# Patient Record
Sex: MALE | Race: WHITE | Employment: OTHER | URBAN - NONMETROPOLITAN AREA
[De-identification: names, ages, dates, MRNs, and addresses within clinical notes are randomized per-mention and may not be internally consistent; named-entity substitution may affect disease eponyms.]

---

## 2020-06-23 ENCOUNTER — HOSPITAL ENCOUNTER (INPATIENT)
Age: 75
LOS: 6 days | Discharge: HOME | DRG: 885 | End: 2020-06-29
Attending: PEDIATRICS | Admitting: PSYCHIATRY & NEUROLOGY
Payer: MEDICARE

## 2020-06-23 LAB
ALBUMIN SERPL-MCNC: 3.9 G/DL (ref 3.5–5.2)
ALP BLD-CCNC: 114 U/L (ref 40–130)
ALT SERPL-CCNC: 18 U/L (ref 5–41)
AMORPHOUS: ABNORMAL /HPF
AMPHETAMINE SCREEN, URINE: NEGATIVE
ANION GAP SERPL CALCULATED.3IONS-SCNC: 10 MMOL/L (ref 7–19)
AST SERPL-CCNC: 22 U/L (ref 5–40)
BACTERIA: ABNORMAL /HPF
BARBITURATE SCREEN URINE: NEGATIVE
BASOPHILS ABSOLUTE: 0 K/UL (ref 0–0.2)
BASOPHILS RELATIVE PERCENT: 0.4 % (ref 0–1)
BENZODIAZEPINE SCREEN, URINE: NEGATIVE
BILIRUB SERPL-MCNC: 0.3 MG/DL (ref 0.2–1.2)
BILIRUBIN URINE: NEGATIVE
BLOOD, URINE: ABNORMAL
BUN BLDV-MCNC: 25 MG/DL (ref 8–23)
CALCIUM SERPL-MCNC: 9.1 MG/DL (ref 8.8–10.2)
CANNABINOID SCREEN URINE: NEGATIVE
CELLULAR CASTS: ABNORMAL /LPF
CHLORIDE BLD-SCNC: 103 MMOL/L (ref 98–111)
CLARITY: ABNORMAL
CO2: 27 MMOL/L (ref 22–29)
COCAINE METABOLITE SCREEN URINE: NEGATIVE
COLOR: YELLOW
CREAT SERPL-MCNC: 1.6 MG/DL (ref 0.5–1.2)
EOSINOPHILS ABSOLUTE: 0.1 K/UL (ref 0–0.6)
EOSINOPHILS RELATIVE PERCENT: 1.1 % (ref 0–5)
EPITHELIAL CELLS, UA: ABNORMAL /HPF
ETHANOL: <10 MG/DL (ref 0–0.08)
GFR NON-AFRICAN AMERICAN: 42
GLUCOSE BLD-MCNC: 102 MG/DL (ref 74–109)
GLUCOSE URINE: NEGATIVE MG/DL
HCT VFR BLD CALC: 42.2 % (ref 42–52)
HEMOGLOBIN: 14.9 G/DL (ref 14–18)
IMMATURE GRANULOCYTES #: 0 K/UL
KETONES, URINE: NEGATIVE MG/DL
LEUKOCYTE ESTERASE, URINE: ABNORMAL
LYMPHOCYTES ABSOLUTE: 1.7 K/UL (ref 1.1–4.5)
LYMPHOCYTES RELATIVE PERCENT: 22.8 % (ref 20–40)
Lab: NORMAL
MCH RBC QN AUTO: 35.7 PG (ref 27–31)
MCHC RBC AUTO-ENTMCNC: 35.3 G/DL (ref 33–37)
MCV RBC AUTO: 101.2 FL (ref 80–94)
MONOCYTES ABSOLUTE: 0.8 K/UL (ref 0–0.9)
MONOCYTES RELATIVE PERCENT: 10.9 % (ref 0–10)
NEUTROPHILS ABSOLUTE: 4.8 K/UL (ref 1.5–7.5)
NEUTROPHILS RELATIVE PERCENT: 64.4 % (ref 50–65)
NITRITE, URINE: NEGATIVE
OPIATE SCREEN URINE: NEGATIVE
PDW BLD-RTO: 12.1 % (ref 11.5–14.5)
PH UA: 7 (ref 5–8)
PLATELET # BLD: 157 K/UL (ref 130–400)
PMV BLD AUTO: 9.1 FL (ref 9.4–12.4)
POTASSIUM REFLEX MAGNESIUM: 3.9 MMOL/L (ref 3.5–5)
PROTEIN UA: ABNORMAL MG/DL
RBC # BLD: 4.17 M/UL (ref 4.7–6.1)
RBC UA: ABNORMAL /HPF (ref 0–2)
SODIUM BLD-SCNC: 140 MMOL/L (ref 136–145)
SPECIFIC GRAVITY UA: 1.02 (ref 1–1.03)
TOTAL PROTEIN: 7.1 G/DL (ref 6.6–8.7)
UROBILINOGEN, URINE: 0.2 E.U./DL
WBC # BLD: 7.4 K/UL (ref 4.8–10.8)
WBC UA: ABNORMAL /HPF (ref 0–5)

## 2020-06-23 PROCEDURE — 87086 URINE CULTURE/COLONY COUNT: CPT

## 2020-06-23 PROCEDURE — G0480 DRUG TEST DEF 1-7 CLASSES: HCPCS

## 2020-06-23 PROCEDURE — 85025 COMPLETE CBC W/AUTO DIFF WBC: CPT

## 2020-06-23 PROCEDURE — 80053 COMPREHEN METABOLIC PANEL: CPT

## 2020-06-23 PROCEDURE — 99285 EMERGENCY DEPT VISIT HI MDM: CPT

## 2020-06-23 PROCEDURE — 36415 COLL VENOUS BLD VENIPUNCTURE: CPT

## 2020-06-23 PROCEDURE — 81001 URINALYSIS AUTO W/SCOPE: CPT

## 2020-06-23 PROCEDURE — 80307 DRUG TEST PRSMV CHEM ANLYZR: CPT

## 2020-06-23 PROCEDURE — 87186 SC STD MICRODIL/AGAR DIL: CPT

## 2020-06-23 PROCEDURE — 1240000000 HC EMOTIONAL WELLNESS R&B

## 2020-06-23 PROCEDURE — 6370000000 HC RX 637 (ALT 250 FOR IP): Performed by: PSYCHIATRY & NEUROLOGY

## 2020-06-23 RX ORDER — CEFDINIR 300 MG/1
300 CAPSULE ORAL EVERY 12 HOURS SCHEDULED
Status: DISCONTINUED | OUTPATIENT
Start: 2020-06-23 | End: 2020-06-24

## 2020-06-23 RX ORDER — TRAZODONE HYDROCHLORIDE 50 MG/1
50 TABLET ORAL NIGHTLY
Status: DISCONTINUED | OUTPATIENT
Start: 2020-06-23 | End: 2020-06-24

## 2020-06-23 RX ORDER — ACETAMINOPHEN 325 MG/1
650 TABLET ORAL EVERY 4 HOURS PRN
Status: DISCONTINUED | OUTPATIENT
Start: 2020-06-23 | End: 2020-06-29 | Stop reason: HOSPADM

## 2020-06-23 RX ORDER — LANOLIN ALCOHOL/MO/W.PET/CERES
3 CREAM (GRAM) TOPICAL NIGHTLY PRN
Status: DISCONTINUED | OUTPATIENT
Start: 2020-06-23 | End: 2020-06-24

## 2020-06-23 RX ORDER — CEFDINIR 300 MG/1
300 CAPSULE ORAL 2 TIMES DAILY
Status: ON HOLD | COMMUNITY
End: 2020-06-29 | Stop reason: HOSPADM

## 2020-06-23 RX ORDER — CLONIDINE HYDROCHLORIDE 0.1 MG/1
0.1 TABLET ORAL EVERY 6 HOURS PRN
Status: DISCONTINUED | OUTPATIENT
Start: 2020-06-23 | End: 2020-06-29 | Stop reason: HOSPADM

## 2020-06-23 RX ORDER — POLYETHYLENE GLYCOL 3350 17 G/17G
17 POWDER, FOR SOLUTION ORAL DAILY PRN
Status: DISCONTINUED | OUTPATIENT
Start: 2020-06-23 | End: 2020-06-29 | Stop reason: HOSPADM

## 2020-06-23 RX ADMIN — MELATONIN 3 MG: at 21:25

## 2020-06-23 RX ADMIN — CEFDINIR 300 MG: 300 CAPSULE ORAL at 21:25

## 2020-06-23 RX ADMIN — TRAZODONE HYDROCHLORIDE 50 MG: 50 TABLET ORAL at 21:25

## 2020-06-23 SDOH — HEALTH STABILITY: MENTAL HEALTH: HOW OFTEN DO YOU HAVE A DRINK CONTAINING ALCOHOL?: NEVER

## 2020-06-23 ASSESSMENT — SLEEP AND FATIGUE QUESTIONNAIRES
DIFFICULTY ARISING: YES
RESTFUL SLEEP: NO
DO YOU HAVE DIFFICULTY SLEEPING: YES
DO YOU USE A SLEEP AID: YES
DIFFICULTY STAYING ASLEEP: YES
DIFFICULTY FALLING ASLEEP: YES
AVERAGE NUMBER OF SLEEP HOURS: 6
SLEEP PATTERN: DIFFICULTY FALLING ASLEEP

## 2020-06-23 ASSESSMENT — PAIN SCALES - GENERAL: PAINLEVEL_OUTOF10: 0

## 2020-06-23 ASSESSMENT — LIFESTYLE VARIABLES: HISTORY_ALCOHOL_USE: NO

## 2020-06-23 ASSESSMENT — PATIENT HEALTH QUESTIONNAIRE - PHQ9: SUM OF ALL RESPONSES TO PHQ QUESTIONS 1-9: 18

## 2020-06-23 NOTE — ED PROVIDER NOTES
abused: None     Forced sexual activity: None   Other Topics Concern    None   Social History Narrative    None       SCREENINGS             PHYSICAL EXAM    (up to 7 for level 4, 8 or more for level 5)     ED Triage Vitals [06/23/20 1604]   BP Temp Temp src Pulse Resp SpO2 Height Weight   106/75 97.6 °F (36.4 °C) -- 96 18 94 % -- 160 lb (72.6 kg)       Physical Exam  Vitals signs reviewed. Constitutional:       Comments: Hard of hearing 66 yr old male   HENT:      Head: Normocephalic. Right Ear: External ear normal.      Left Ear: External ear normal.   Eyes:      Conjunctiva/sclera: Conjunctivae normal.      Pupils: Pupils are equal, round, and reactive to light. Neck:      Musculoskeletal: Normal range of motion. Cardiovascular:      Rate and Rhythm: Normal rate and regular rhythm. Heart sounds: Normal heart sounds. Pulmonary:      Effort: Pulmonary effort is normal.      Breath sounds: Normal breath sounds. Abdominal:      General: Bowel sounds are normal.      Palpations: Abdomen is soft. Comments: Ostomy appliance with health appearing stoma and urine in collection device. Musculoskeletal: Normal range of motion. Skin:     General: Skin is warm and dry. Neurological:      Mental Status: He is alert and oriented to person, place, and time. DIAGNOSTIC RESULTS     EKG: All EKG's are interpreted by the Emergency Department Physician who either signs or Co-signs this chart in the absence of acardiologist.    There is a regular rate and rhythm. SR w/RBBB hr 69b/min Normal OH interval and normal P waves. Normal QT interval. No obvious ST elevation or ST depression.        RADIOLOGY:   Non-plain film images such as CT, Ultrasound andMRI are read by the radiologist. Plain radiographic images are visualized and preliminarily interpreted by the emergency physician with the below findings:        Interpretation per the Radiologist below, if available at the time of this note:    No orders to display         ED BEDSIDE ULTRASOUND:   Performed by ED Physician - none    LABS:  Labs Reviewed   CBC WITH AUTO DIFFERENTIAL - Abnormal; Notable for the following components:       Result Value    RBC 4.17 (*)     .2 (*)     MCH 35.7 (*)     MPV 9.1 (*)     Monocytes % 10.9 (*)     All other components within normal limits   COMPREHENSIVE METABOLIC PANEL W/ REFLEX TO MG FOR LOW K - Abnormal; Notable for the following components:    BUN 25 (*)     CREATININE 1.6 (*)     GFR Non- 42 (*)     All other components within normal limits   URINALYSIS - Abnormal; Notable for the following components:    Clarity, UA TURBID (*)     Blood, Urine TRACE (*)     Protein, UA TRACE (*)     Leukocyte Esterase, Urine LARGE (*)     All other components within normal limits   MICROSCOPIC URINALYSIS - Abnormal; Notable for the following components:    Cellular Cast, UA 1-3 WBC (*)     WBC, UA 31-50 (*)     Bacteria, UA 2+ (*)     Amorphous, UA 1+ (*)     All other components within normal limits   CULTURE, URINE   URINE DRUG SCREEN   ETHANOL   LIPID PANEL   HEMOGLOBIN A1C       All other labs were within normal range or not returned as of this dictation. RE-ASSESSMENT     Pt has been evaluated by mental health profession in conjunction with Dr. Hattie Fischer who accepts patient for voluntary admission to geriatric behavioral health unit. EMERGENCY DEPARTMENT COURSE and DIFFERENTIALDIAGNOSIS/MDM:   Vitals:    Vitals:    06/23/20 1604 06/23/20 1950   BP: 106/75 120/79   Pulse: 96 83   Resp: 18 16   Temp: 97.6 °F (36.4 °C) 97.7 °F (36.5 °C)   TempSrc:  Temporal   SpO2: 94% 93%   Weight: 160 lb (72.6 kg) 172 lb 6.4 oz (78.2 kg)   Height:  5' 5\" (1.651 m)       MDM      CONSULTS:  IP CONSULT TO PSYCHIATRY  IP CONSULT TO INTERNAL MEDICINE  IP CONSULT TO SOCIAL WORK    PROCEDURES:  Unless otherwise notedbelow, none     Procedures    FINAL IMPRESSION     1. Depression with suicidal ideation    2. Urinary tract infection without hematuria, site unspecified    3. Renal insufficiency          DISPOSITION/PLAN   DISPOSITION        PATIENT REFERRED TO:  No follow-up provider specified.     DISCHARGE MEDICATIONS:       Current Discharge Medication List          (Pleasenote that portions of this note were completed with a voice recognition program.  Efforts were made to edit the dictations but occasionally words are mis-transcribed.)              Julian Win, APRN  06/23/20 3202

## 2020-06-23 NOTE — BH NOTE
[facility] ADULT INITIAL INTAKE ASSESSMENT     6/23/20    Channing Antonio ,a 66 y.o. male, presents to the ED for a psychiatric assessment. ED Arrival time: [address]  ED physician: min REEVES Notification time: 56  [facility] Assessment start time: [facility]  Psychiatrist call time:   Spoke with Dr. Leland Rizzo    Patient is referred by: car    Reason for visit to ED - Presenting problem:     PT states reason for ED visit, \" I am majorly depressed. , its been going on for a month. I am at the end of the road. I dont know what to do. I am unable to feel better. The depression is major, everyday it gets worse. I have no appetite I am unable to sleep. I got  a few years ago after 28 years but I have only been gone one month. 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 My ex wife son is a schizophrenic and she has chosen me over him. I am on medicine and I have been taking them correctly but they have quit working. I dont have a therapist or psychiatrist, I get my meds from a doctor. Pt denies ever attempted to hurt self. Pt denies Hi or AVH. Pt denies having any plan but feels very hopeless. Pt has history of bladder cancer 2 years ago and has a urostomy.     Duration of symptoms: month    Current Stressors: family    C-SSRS Completed: yes    SI:  has   Plan: no   Past SI attempts: no   If yes, when and how many times:  Describe suicide attempts:   HI: denies  If yes describe:   Delusions: denies  If yes describe:   Hallucinations: denies   If yes describe:   Risk of Harm to self: Self injurious/self mutilation behaviorsno   If yes explain:   Was it within the past 6 months: no   Risk of Harm to others: no   If yes explain:   Was it within the past 6 months: no   Trauma History:  Anxiety 1-10:  8  Explain if increased:   Depression 1-10:  10  Explain if increased:   Level of function outside hospital decreased: yes   If yes explain:       Psychiatric Hospitalizations: Yes   Where & When: [facility] years ago  Outpatient Psychiatric Treatment:    Family History:    Family history of mental illness: no   \"Depression\",\"Anxiety\",\"Bipolar\",\"Schizophrenia\",\"Borderline\",\"ADHD\"}  Family members with suicide attempt: no   If yes explain (attempted or completed):    Substance Abuse History:     SBIRT Completed: yes  Brief Intervention completed if needed:  (Yes/No)    Current ETOH LEVELS: <10    ETOH Usage:     Amount drinking daily: occasional, social use    Date of last drink: couple days ago   Longest period of sobriety:    Substance/Chemical Abuse/Recreational Drug History:  Substance used: alcohol  Date of last substance use: alcohol two dasy ago   Tobacco Use: no   History of rehab treatment:  How many times in rehab:  Last time in rehab:  Family history of substance abuse:    Opiates: It was discussed with pt they would not be receiving opiates unless they were within 3 days post surgery/acute injury. Patient voiced understanding and agreed. Psychiatric Review Of Systems:     Recent Sleep changes: yes   Recent appetite changes: no   Recent weight changes/Pounds gained (+) or lost (-): no      Medical History:     Medical Diagnosis/Issues:   CT today in ED:no  Use of 02 or CPAP: yes  Ambulatory: no  Independent or Need assistance with Self Care:     PCP: No primary care provider on file. Current Medications:   Scheduled Meds: No current facility-administered medications for this encounter.      Current Outpatient Medications:     Memantine HCl (NAMENDA PO), Take by mouth 2 times daily, Disp: , Rfl:     Cetirizine HCl (ZYRTEC ALLERGY PO), Take by mouth, Disp: , Rfl:     Apixaban (ELIQUIS PO), Take by mouth 2 times daily, Disp: , Rfl:     METOPROLOL TARTRATE PO, Take by mouth 2 times daily, Disp: , Rfl:     Montelukast Sodium (SINGULAIR PO), Take by mouth daily, Disp: , Rfl:     TRAZODONE HCL PO, Take by mouth nightly, Disp: , Rfl:     ipratropium (ATROVENT) 0.02 % nebulizer solution, Take 0.5 mg by nebulization 4 times daily, Disp: , Rfl:     Donepezil HCl (ARICEPT PO), Take by mouth nightly, Disp: , Rfl:      Mental Status Evaluation:     Appearance:  casually dressed   Behavior:  Within Normal Limits   Speech:  normal pitch   Mood:  depressed   Affect:  normal   Thought Process:  within normal limits   Thought Content:  suicidal   Sensorium:  person, place and time/date   Cognition:  grossly intact   Insight:  good       Collateral Information:     Name:   Relationship:   Phone Number:   Collateral:     Current living arrangement: lives with sister   Current Support System: family   Employment: retired     Disposition:     Choose one of the options below for disposition:     1. Decision to admit to :yes    If yes, which unit Adult or Geriatric Unit:  Geriatric  Is patient voluntary: yes  If no has a 72 hold been initiated:   Admission Diagnosis: SI    Does the patient have a guardian or Medical POA:   Has the guardian been notified or Medical POA:       2. Decision to Discharge:   Does not meet criteria for acceptance to   unit due to:     3. Transferred:       Patient was transferred due to:      Other follow up information provided:      Colleen Yip RN

## 2020-06-24 LAB
CHOLESTEROL, TOTAL: 158 MG/DL (ref 160–199)
HBA1C MFR BLD: 5.1 % (ref 4–6)
HDLC SERPL-MCNC: 34 MG/DL (ref 55–121)
LDL CHOLESTEROL CALCULATED: 85 MG/DL
TRIGL SERPL-MCNC: 197 MG/DL (ref 0–149)

## 2020-06-24 PROCEDURE — 94640 AIRWAY INHALATION TREATMENT: CPT

## 2020-06-24 PROCEDURE — 80061 LIPID PANEL: CPT

## 2020-06-24 PROCEDURE — 99223 1ST HOSP IP/OBS HIGH 75: CPT | Performed by: PSYCHIATRY & NEUROLOGY

## 2020-06-24 PROCEDURE — 6360000002 HC RX W HCPCS: Performed by: PSYCHIATRY & NEUROLOGY

## 2020-06-24 PROCEDURE — 6370000000 HC RX 637 (ALT 250 FOR IP): Performed by: FAMILY MEDICINE

## 2020-06-24 PROCEDURE — 1240000000 HC EMOTIONAL WELLNESS R&B

## 2020-06-24 PROCEDURE — 83036 HEMOGLOBIN GLYCOSYLATED A1C: CPT

## 2020-06-24 PROCEDURE — 6370000000 HC RX 637 (ALT 250 FOR IP): Performed by: PSYCHIATRY & NEUROLOGY

## 2020-06-24 PROCEDURE — 36415 COLL VENOUS BLD VENIPUNCTURE: CPT

## 2020-06-24 RX ORDER — MONTELUKAST SODIUM 10 MG/1
10 TABLET ORAL DAILY
Status: DISCONTINUED | OUTPATIENT
Start: 2020-06-24 | End: 2020-06-29 | Stop reason: HOSPADM

## 2020-06-24 RX ORDER — TRAZODONE HYDROCHLORIDE 50 MG/1
25 TABLET ORAL NIGHTLY PRN
Status: DISCONTINUED | OUTPATIENT
Start: 2020-06-24 | End: 2020-06-26

## 2020-06-24 RX ORDER — LEVOFLOXACIN 5 MG/ML
250 INJECTION, SOLUTION INTRAVENOUS EVERY 24 HOURS
Status: DISCONTINUED | OUTPATIENT
Start: 2020-06-25 | End: 2020-06-24

## 2020-06-24 RX ORDER — SERTRALINE HYDROCHLORIDE 100 MG/1
100 TABLET, FILM COATED ORAL DAILY
Status: DISCONTINUED | OUTPATIENT
Start: 2020-06-24 | End: 2020-06-26

## 2020-06-24 RX ORDER — LEVOFLOXACIN 5 MG/ML
500 INJECTION, SOLUTION INTRAVENOUS ONCE
Status: DISCONTINUED | OUTPATIENT
Start: 2020-06-24 | End: 2020-06-24

## 2020-06-24 RX ORDER — MEMANTINE HYDROCHLORIDE 5 MG/1
10 TABLET ORAL 2 TIMES DAILY
Status: DISCONTINUED | OUTPATIENT
Start: 2020-06-24 | End: 2020-06-29 | Stop reason: HOSPADM

## 2020-06-24 RX ORDER — SERTRALINE HYDROCHLORIDE 25 MG/1
25 TABLET, FILM COATED ORAL DAILY
Status: ON HOLD | COMMUNITY
End: 2020-06-29 | Stop reason: HOSPADM

## 2020-06-24 RX ORDER — DONEPEZIL HYDROCHLORIDE 5 MG/1
10 TABLET, FILM COATED ORAL NIGHTLY
Status: DISCONTINUED | OUTPATIENT
Start: 2020-06-24 | End: 2020-06-29 | Stop reason: HOSPADM

## 2020-06-24 RX ORDER — MIRTAZAPINE 7.5 MG/1
7.5 TABLET, FILM COATED ORAL NIGHTLY
Status: DISCONTINUED | OUTPATIENT
Start: 2020-06-24 | End: 2020-06-29 | Stop reason: HOSPADM

## 2020-06-24 RX ORDER — LANOLIN ALCOHOL/MO/W.PET/CERES
3 CREAM (GRAM) TOPICAL NIGHTLY
Status: DISCONTINUED | OUTPATIENT
Start: 2020-06-24 | End: 2020-06-29 | Stop reason: HOSPADM

## 2020-06-24 RX ORDER — LEVOFLOXACIN 500 MG/1
500 TABLET, FILM COATED ORAL ONCE
Status: COMPLETED | OUTPATIENT
Start: 2020-06-24 | End: 2020-06-24

## 2020-06-24 RX ORDER — LEVOFLOXACIN 250 MG/1
250 TABLET ORAL DAILY
Status: DISCONTINUED | OUTPATIENT
Start: 2020-06-25 | End: 2020-06-29 | Stop reason: HOSPADM

## 2020-06-24 RX ADMIN — MELATONIN 3 MG: at 20:56

## 2020-06-24 RX ADMIN — APIXABAN 5 MG: 5 TABLET, FILM COATED ORAL at 18:09

## 2020-06-24 RX ADMIN — SERTRALINE HYDROCHLORIDE 100 MG: 100 TABLET ORAL at 18:09

## 2020-06-24 RX ADMIN — MONTELUKAST SODIUM 10 MG: 10 TABLET, COATED ORAL at 18:09

## 2020-06-24 RX ADMIN — MIRTAZAPINE 7.5 MG: 7.5 TABLET ORAL at 20:56

## 2020-06-24 RX ADMIN — CEFDINIR 300 MG: 300 CAPSULE ORAL at 12:09

## 2020-06-24 RX ADMIN — DONEPEZIL HYDROCHLORIDE 10 MG: 5 TABLET, FILM COATED ORAL at 20:56

## 2020-06-24 RX ADMIN — MEMANTINE HYDROCHLORIDE 10 MG: 5 TABLET ORAL at 20:56

## 2020-06-24 RX ADMIN — IPRATROPIUM BROMIDE 0.5 MG: 0.5 SOLUTION RESPIRATORY (INHALATION) at 20:00

## 2020-06-24 RX ADMIN — LEVOFLOXACIN 500 MG: 500 TABLET, FILM COATED ORAL at 18:09

## 2020-06-24 ASSESSMENT — PAIN SCALES - GENERAL: PAINLEVEL_OUTOF10: 0

## 2020-06-24 NOTE — PLAN OF CARE
Problem: Altered Mood, Depressive Behavior:  Goal: Able to verbalize acceptance of life and situations over which he or she has no control  Description: Able to verbalize acceptance of life and situations over which he or she has no control  Outcome: Ongoing  Goal: Able to verbalize and/or display a decrease in depressive symptoms  Description: Able to verbalize and/or display a decrease in depressive symptoms  Outcome: Ongoing  Goal: Ability to disclose and discuss suicidal ideas will improve  Description: Ability to disclose and discuss suicidal ideas will improve  Outcome: Ongoing  Goal: Able to verbalize support systems  Description: Able to verbalize support systems  Outcome: Ongoing  Goal: Absence of self-harm  Description: Absence of self-harm  Outcome: Ongoing  Goal: Patient specific goal  Description: Patient specific goal  Outcome: Ongoing  Goal: Participates in care planning  Description: Participates in care planning  Outcome: Ongoing     Problem: Falls - Risk of:  Goal: Will remain free from falls  Description: Will remain free from falls  Outcome: Ongoing  Goal: Absence of physical injury  Description: Absence of physical injury  Outcome: Ongoing     Problem: Anxiety:  Goal: Level of anxiety will decrease  Description: Level of anxiety will decrease  Outcome: Ongoing     Problem: Pain:  Goal: Pain level will decrease  Description: Pain level will decrease  Outcome: Ongoing  Goal: Control of acute pain  Description: Control of acute pain  Outcome: Ongoing  Goal: Control of chronic pain  Description: Control of chronic pain  Outcome: Ongoing     Problem: Health Behavior:  Goal: Ability to verbalize adaptive coping strategies to use when suicidal feelings occur will improve  Description: Ability to verbalize adaptive coping strategies to use when suicidal feelings occur will improve  Outcome: Ongoing  Goal: Ability to verbalize adaptive coping strategies to use when the urge to self-mutilate occurs will improve  Description: Ability to verbalize adaptive coping strategies to use when the urge to self-mutilate occurs will improve  Outcome: Ongoing     Problem: Safety:  Goal: Ability to contract for his/her safety will improve  Description: Ability to contract for his/her safety will improve  Outcome: Ongoing

## 2020-06-24 NOTE — PROGRESS NOTES
Group Therapy Note    Start Time: 800  End Time:  900  Number of Participants: 6    Type of Group: Community Meeting       Patient's Goal:        Notes:  Pt did not have any goals      Participation Level:  Active Listener       Participation Quality: Appropriate      Thought Process/Content: Logical      Affective Functioning: Congruent      Mood: Calm      Level of consciousness:  Alert      Modes of Intervention: Support      Discipline Responsible: Behavioral Health Tech II      Signature:  Meredith Miller

## 2020-06-24 NOTE — PROGRESS NOTES
[facility] Admission From ED  Nursing Admission Note    Admission Type: Voluntary    Reason for Admission: pt has depression. pt feels hopeless and feels like its at the end of his road. There is no problem list on file for this patient. Pt admitted from Dr. Elisa Fung care in ED to [address] room 0618/618-01. Arrived on unit via [facility] with security and nursing escort. Pt appropriately attired in paper scrubs. Body assessment completed by Fay HOLLINS RN and Emma Monroe RN with no contraband discovered. All tubes, lines, and drains were appropriately discontinued by ED staff prior to pt transfer to [facility]. Pt belongings and valuables to be inventoried and cataloged, stored per policy. Pt oriented to surroundings, program expectations, and copy pt rights given. Received admit packet: [facility], Visitation Info, Fall Prevention, Restraints Info. Consents reviewed, signed Pt Rights, Handbook Acceptance, Visit/Call Acceptance, PHI Release, Social Info Release, and Treatment Agreement. Pt verbalizes understanding. Pt is a smoker? No.   Pt offered Nicotine patch? No.  Pt refused Nicotine patch? No.     Identifies stressors. Situation with wife and her son. Status and Exam  Normal: No  Facial Expression: Sad  Affect: Appropriate  Level of Consciousness: Alert  Mood:Normal: No  Mood: Depressed, Anxious  Motor Activity:Normal: Yes  Interview Behavior: Cooperative  Attention:Normal: Yes  Thought Content:Normal: Yes  Hallucinations: None  Delusions: No  Memory:Normal: No  Memory: Poor Recent  Insight and Judgment: Yes  Present Suicidal Ideation: No  Present Homicidal Ideation: No    C/o: sinuses    Notes:Pt arrived to the unit per policy. Pt is extremely hard of hearing but declines to have his hearing aides brought up to the unit. Pt states \"they are too expensive. \" Pt also reports that he uses a CPAP machine at home but also declines to have one tonight stating \"I will just see how I do. \" Dr. Meaghan Pretty aware and agreeable.  Pt has a urostomy noted to his right abdominal area; appears patent and draining, and pt performs self care for the urostomy. Pt has asked that his estranged wife, Gogo Ortiz, be notified of his admission. Several attempts to contact her were made, but were unsuccessful. Pt's wife's number, as provided by pt, is [phone]. Pt's medications have not been verified. Pt reports that they are filled at [facility] in [place], [place], But was quite unsure when providing this information. Pt is resting quietly now. Pt ambulates with a steady gait and independent of any aide devices. Monitoring for safety continues.      Electronically signed by Janis Machado RN on 6/23/2020 at 9:51 PM

## 2020-06-24 NOTE — PROGRESS NOTES
SW completed psychosocial and CSSR-S on this date. Pt long and short term goals discussed. Pt voiced understanding. Treatment plan sheet signed. Pt verbalized understanding of the treatment plan. Pt participated in goals and objectives of the treatment plan. Pt completed safety plan with , pt received copy of plan, and original was placed into pt's chart. It was identified that pt will require outpatient follow up appointments at local [facility] behavioral health facility such as[address]. Pt validated need for appointments. Pt was also provided a handout of contact information for drug and alcohol treatment centers and other community support service such as [facility] and [employer]. In the last 6 months has the pt been danger to self: Yes  In the last 6 months has the pt been danger to others: No  Legal Guardian/POA: No      Provided pt with [employer] handout entitled \"Quitting Smoking,\" reviewed handout with pt addressing dangers of smoking, developing coping skills, and providing basic information about quitting. Patient received all components / Patient refused/declined practical counseling of tobacco practical counseling during the hospital stay.      Electronically signed by Ely Chatterjee, [id number] on 6/24/2020 at 11:23 AM

## 2020-06-24 NOTE — PROGRESS NOTES
I Admission From ED  Nursing Admission Note    Admission Type: Voluntary    Reason for Admission: pt has depression. pt feels hopeless and feels like its at the end of his road. There is no problem list on file for this patient.  ,     Identifies stressors.      Status and Exam  Normal: No  Facial Expression: Sad  Affect: Appropriate  Level of Consciousness: Alert  Mood:Normal: No  Mood: Depressed, Anxious  Motor Activity:Normal: Yes  Interview Behavior: Cooperative  Attention:Normal: Yes  Thought Content:Normal: Yes  Hallucinations: None  Delusions: No  Memory:Normal: No  Memory: Poor Recent  Insight and Judgment: Yes  Present Suicidal Ideation: No  Present Homicidal Ideation: No    C/o:    Notes:
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
## 2020-06-24 NOTE — H&P
Department of Psychiatry  Attending History and Physical        CHIEF COMPLAINT: \"I feel depressed\"    History obtained from: patient, chart    HISTORY OF PRESENT ILLNESS:    53-year-old white male with history of depression and dementia, bladder cancer (has urostomy), new to our service, admitted for worsening depression, poor function and hopelessness. Patient stated he got  a few years ago after 29 years of marriage, however, he  from his wife only about 2 months ago. UDS negative, BAL less than 10. Came with OLEG and UTI. Patient is observed resting in bed this morning. He is hard of hearing, and interview was, therefore, somewhat limited. He is calm and cooperative. Smiling at times. Oriented to person, place, date and situation. States he is feeling better today. Had a good rest last night. Denies suicidal ideation at this time. States he has been feeling depressed in the setting of his social stressors. He has been having a hard time adjusting to his life after separation with his wife. He has no children of his own, and has been feeling lonely. \"I feel that I need to find a new purpose in life. \"  He reports low mood, anhedonia, poor sleep and appetite. Voices hopelessness and helplessness. He denies auditory and visual hallucinations. Denies mood swings and racing thoughts. Denies paranoia. He is open to medication adjustments. States his sister is his main support. Gave us permission to contact her. States she can bring his hearing aids and dentures to the unit. Patient currently denies physical symptoms. PSYCHIATRIC HISTORY:    Diagnoses: Depression  Suicide attempts/gestures: Denies   Prior hospitalizations: [facility] over 20 years ago  Medication trials: Denies   Mental health contact: Lost to follow-up   Head trauma: Denies    SUBSTANCE USE HISTORY:  Drinks socially. Denies illicit drug use. Denies tobacco use.     Past Medical History:    Past Medical History:   Diagnosis Date    Dementia ([address]     Depression     [facility]        Past Surgical History:    History reviewed. No pertinent surgical history. Medications Prior to Admission:   Prior to Admission medications    Medication Sig Start Date End Date Taking? Authorizing Provider   Memantine HCl (NAMENDA PO) Take by mouth 2 times daily   Yes Historical Provider, MD   Cetirizine HCl (ZYRTEC ALLERGY PO) Take by mouth   Yes Historical Provider, MD   Apixaban (ELIQUIS PO) Take by mouth 2 times daily   Yes Historical Provider, MD   METOPROLOL TARTRATE PO Take by mouth 2 times daily   Yes Historical Provider, MD   Montelukast Sodium (SINGULAIR PO) Take by mouth daily   Yes Historical Provider, MD   TRAZODONE HCL PO Take by mouth nightly   Yes Historical Provider, MD   ipratropium (ATROVENT) 0.02 % nebulizer solution Take 0.5 mg by nebulization 4 times daily   Yes Historical Provider, MD   Donepezil HCl (ARICEPT PO) Take by mouth nightly   Yes Historical Provider, MD   cefdinir (OMNICEF) 300 MG capsule Take 300 mg by mouth 2 times daily   Yes Historical Provider, MD       Allergies:  Patient has no known allergies. Social History:  . No children of his own. Ex-wife has a son with schizophrenia. Retired. Previously worked as a psychotherapist.    Family History:   History reviewed. No pertinent family history. No history of psychiatric illness or suicide attempts. REVIEW OF SYSTEMS:  General: No fevers, chills, night sweats, no recent weight loss or gain. Head: No headache, no migraine. Eyes: No recent visual changes. Ears: No recent hearing changes. Nose: No increased congestion or change in sense of smell. Throat: No sore throat, no trouble swallowing. Cardiovascular: No chest pain or palpitations, or dizziness. Respiratory: No cough, wheezes, congestion, or shortness of breath.   Gastrointestinal: No abdominal pain, nausea or vomiting, no diarrhea or constipation. Musculo-skeletal: No edema, deformities, or loss of functions. Neurological: No loss of consciousness, abnormal sensations, or weakness. Skin: No rash, abrasions or bruises. PHYSICAL EXAM:  On observation  GENERAL APPEARANCE: Stated age, in NAD. HEAD: Normocephalic, atraumatic. CHEST: No deformities. MUSCULOSKELTAL: No obvious deformities, clubbing, cyanosis or edema. NEUROLOGICAL: Alert, oriented x 3, CN II-XII grossly intact. No abnormal movements or tremors. Gait stable. SKIN: Warm, dry, intact, no rash, abrasions, bruises. Vitals:  /64   Pulse 70   Temp 97.1 °F (36.2 °C) (Temporal)   Resp 18   Ht 5' 5\" (1.651 m)   Wt 172 lb 6.4 oz (78.2 kg)   SpO2 95%   BMI 28.69 kg/m²     Mental Status Examination:    Appearance: Stated age, fairly well groomed, in hospital attire. Behavior: Calm, cooperative, hard of hearing. Notable psychomotor retardation. Speech: Normal in tone, volume, and quality. No slurring, dysarthria or pressured speech noted. Mood: \" Depressed\"   Affect: Mood congruent. Thought Process: Appears linear. Thought Content: Denies suicidal and homicidal ideation. Voices hopelessness and helplessness. No overt delusions or paranoia appreciated. Perceptions: Denies auditory or visual hallucinations at present time. Not responding to internal stimuli. Concentration: Intact. Orientation: to person, place, date, and situation. Language: Intact. Fund of information: Intact. Memory: Recent and remote appear grossly intact. Neurovegitative: Poor appetite and sleep. Insight: Impaired. Judgment: Impaired.     DATA:  Lab Results   Component Value Date    WBC 7.4 06/23/2020    HGB 14.9 06/23/2020    HCT 42.2 06/23/2020    .2 (H) 06/23/2020     06/23/2020     Lab Results   Component Value Date     06/23/2020    K 3.9 06/23/2020     06/23/2020    CO2 27 06/23/2020    BUN 25 (H) 06/23/2020 understanding and has capacity to give informed consent.  -SW help evaluate home environment and provide outpatient resources.  -Discuss with treatment team.

## 2020-06-24 NOTE — PROGRESS NOTES
Call placed to Dr. Irineo Alvarez in regard to medication orders. Home meds reviewed(not verified with pharmacy.) Vital signs reviewed. Orders received to give Trazadone 50mg at night PO, Melatonin 3mg PO PRN for sleep, Clonidine 0.1mg PO Q6 PRN High BP, systolic >139 and diastolic >398. Dr. Amara Abdalla made aware of pt's wish to wait on CPAP after tonight's rest to see how he does without it. Dr. Amara Abdalla in agreement. Dr. Amara Abdalla declines to order I&O monitoring at this time. Orders to be executed as received.      Electronically signed by Misael Carter RN on 6/23/2020 at 8:54 PM

## 2020-06-24 NOTE — PROGRESS NOTES
Pharmacy Renal Adjustment    Aime Campbell is a 66 y.o. male. Pharmacy has renally adjusted medications per protocol. Recent Labs     06/23/20  1640   BUN 25*       Recent Labs     06/23/20  1640   CREATININE 1.6*       Estimated Creatinine Clearance: 38 mL/min (A) (based on SCr of 1.6 mg/dL (H)). Height:   Ht Readings from Last 1 Encounters:   06/23/20 5' 5\" (1.651 m)     Weight:  Wt Readings from Last 1 Encounters:   06/23/20 172 lb 6.4 oz (78.2 kg)       Plan: Adjust the following medications based on renal function:  Change Levaquin to 500mg IV x 1 dose then Levaquin 250mg IV q 24 hours.     JAMES SAN, PHARM D, 6/24/2020, 5:11 PM

## 2020-06-24 NOTE — PROGRESS NOTES
SW met with treatment team to discuss pt's progress and setbacks. SW 2 was present. Pt was admitted, voluntary, for depression, SI without a plan, reports poor functioning at home, has hx of psychiatric treatment/admission, identifies current stressors as family issues, denies HI/AVH. Since admission, pt reportedly was cooperative with admission process, alert/oriented x 4, is [place], but refuses to have his hearing aides brought to the unit, slept well last night with medications, denies SI/HI/AVH, continue current treatment plan.

## 2020-06-24 NOTE — PROGRESS NOTES
Pt awake this morning for lab work. Pt reporting that \" I just can't believe how much better I feel after one night. I slept so good. \" Pt reports to nursing that it didn't bother him very much that he didn't have his CPAP overnight. Pt appears to be overjoyed with his progress so far and states so several times to this nurse and the . Pt continues to rest this morning. Monitoring for safety continues.      Electronically signed by Haylie Messina RN on 6/24/2020 at 5:40 AM

## 2020-06-24 NOTE — PROGRESS NOTES
Admission Note      Reason for admission/Target Symptom: Patient admitted to [facility] due to depression and suicidal thoughts, pt reported he is , was in a facility in [place], worsening depression over past month, reported he was at the end of his rope, denies HI/AVH. Diagnoses: Depression with suicidal ideation, Urinary tract infection without hematuria, site unspecified, Renal insufficiency   UDS: Negative  BAL:  Negative ,10    SW met with treatment team to discuss patient's treatment including care planning, discharge planning, and follow-up needs. Pt has been admitted to [facility]. Treatment team has identified patient's discharge needs as medication management and outpatient therapy/counseling. Pt confirmed  the need for ongoing treatment post inpatient stay. Pt was also provided a handout of contact information for drug and alcohol treatment centers and other community support service such as BILL, AA, and [facility].     Electronically signed by Pratik Camacho [facility] on 6/24/2020 at 9:06 AM

## 2020-06-25 LAB
ANION GAP SERPL CALCULATED.3IONS-SCNC: 13 MMOL/L (ref 7–19)
BUN BLDV-MCNC: 22 MG/DL (ref 8–23)
CALCIUM SERPL-MCNC: 8.8 MG/DL (ref 8.8–10.2)
CHLORIDE BLD-SCNC: 106 MMOL/L (ref 98–111)
CO2: 25 MMOL/L (ref 22–29)
CREAT SERPL-MCNC: 1.4 MG/DL (ref 0.5–1.2)
GFR NON-AFRICAN AMERICAN: 49
GLUCOSE BLD-MCNC: 94 MG/DL (ref 74–109)
ORGANISM: ABNORMAL
POTASSIUM SERPL-SCNC: 4 MMOL/L (ref 3.5–5)
SODIUM BLD-SCNC: 144 MMOL/L (ref 136–145)
TSH REFLEX FT4: 1.78 UIU/ML (ref 0.35–5.5)
URINE CULTURE, ROUTINE: ABNORMAL
URINE CULTURE, ROUTINE: ABNORMAL
VITAMIN B-12: 537 PG/ML (ref 211–946)
VITAMIN D 25-HYDROXY: 48.3 NG/ML

## 2020-06-25 PROCEDURE — 84443 ASSAY THYROID STIM HORMONE: CPT

## 2020-06-25 PROCEDURE — 6360000002 HC RX W HCPCS: Performed by: PSYCHIATRY & NEUROLOGY

## 2020-06-25 PROCEDURE — 6370000000 HC RX 637 (ALT 250 FOR IP): Performed by: PSYCHIATRY & NEUROLOGY

## 2020-06-25 PROCEDURE — 82607 VITAMIN B-12: CPT

## 2020-06-25 PROCEDURE — 94640 AIRWAY INHALATION TREATMENT: CPT

## 2020-06-25 PROCEDURE — 99233 SBSQ HOSP IP/OBS HIGH 50: CPT | Performed by: PSYCHIATRY & NEUROLOGY

## 2020-06-25 PROCEDURE — 1240000000 HC EMOTIONAL WELLNESS R&B

## 2020-06-25 PROCEDURE — 36415 COLL VENOUS BLD VENIPUNCTURE: CPT

## 2020-06-25 PROCEDURE — 6370000000 HC RX 637 (ALT 250 FOR IP): Performed by: FAMILY MEDICINE

## 2020-06-25 PROCEDURE — 82306 VITAMIN D 25 HYDROXY: CPT

## 2020-06-25 PROCEDURE — 80048 BASIC METABOLIC PNL TOTAL CA: CPT

## 2020-06-25 RX ADMIN — MEMANTINE HYDROCHLORIDE 10 MG: 5 TABLET ORAL at 21:03

## 2020-06-25 RX ADMIN — APIXABAN 5 MG: 5 TABLET, FILM COATED ORAL at 08:37

## 2020-06-25 RX ADMIN — SERTRALINE HYDROCHLORIDE 100 MG: 100 TABLET ORAL at 08:37

## 2020-06-25 RX ADMIN — IPRATROPIUM BROMIDE 0.5 MG: 0.5 SOLUTION RESPIRATORY (INHALATION) at 14:51

## 2020-06-25 RX ADMIN — LEVOFLOXACIN 250 MG: 250 TABLET, FILM COATED ORAL at 17:58

## 2020-06-25 RX ADMIN — MEMANTINE HYDROCHLORIDE 10 MG: 5 TABLET ORAL at 08:37

## 2020-06-25 RX ADMIN — IPRATROPIUM BROMIDE 0.5 MG: 0.5 SOLUTION RESPIRATORY (INHALATION) at 11:30

## 2020-06-25 RX ADMIN — IPRATROPIUM BROMIDE 0.5 MG: 0.5 SOLUTION RESPIRATORY (INHALATION) at 19:14

## 2020-06-25 RX ADMIN — APIXABAN 5 MG: 5 TABLET, FILM COATED ORAL at 21:03

## 2020-06-25 RX ADMIN — DONEPEZIL HYDROCHLORIDE 10 MG: 5 TABLET, FILM COATED ORAL at 21:03

## 2020-06-25 RX ADMIN — MIRTAZAPINE 7.5 MG: 7.5 TABLET ORAL at 21:04

## 2020-06-25 RX ADMIN — IPRATROPIUM BROMIDE 0.5 MG: 0.5 SOLUTION RESPIRATORY (INHALATION) at 07:27

## 2020-06-25 RX ADMIN — MELATONIN 3 MG: at 21:03

## 2020-06-25 RX ADMIN — MONTELUKAST SODIUM 10 MG: 10 TABLET, COATED ORAL at 08:37

## 2020-06-25 ASSESSMENT — PAIN SCALES - GENERAL: PAINLEVEL_OUTOF10: 0

## 2020-06-25 NOTE — PROGRESS NOTES
Group Therapy Note    Date: 6/25/2020  Start Time: 1700  End Time:  7077  Number of Participants: 3    Type of Group: Healthy Living/Wellness    Status After Intervention:  Unchanged    Participation Level: None    Participation Quality: Resistant      Speech:  normal            Endings: None Reported    Modes of Intervention: Support      Discipline Responsible: Registered Nurse      Signature:  Ruthy Godoy RN

## 2020-06-25 NOTE — PLAN OF CARE
Problem: Altered Mood, Depressive Behavior:  Goal: Able to verbalize acceptance of life and situations over which he or she has no control  Description: Able to verbalize acceptance of life and situations over which he or she has no control  6/25/2020 1343 by Fawad Humphrey  Outcome: Ongoing  Note:                                                                     Group Therapy Note    Date: 6/25/2020  Start Time: 1100  End Time:  9246  Number of Participants: 5    Type of Group: Psychoeducation    Wellness Binder Information  Module Name:  Men's Issues  Session Number:  1    Group Goal for Pt: To improve knowledge of effective stress management techniques    Notes:  Pt demonstrated improved knowledge of effective stress management techniques by actively participating in group discussion.     Status After Intervention:  Unchanged    Participation Level: Minimal    Participation Quality: Resistant      Speech:  normal      Thought Process/Content: Logical      Affective Functioning: Congruent      Mood: anxious and depressed      Level of consciousness:  Alert and Oriented x4      Response to Learning: Able to verbalize current knowledge/experience, Able to verbalize/acknowledge new learning, and Resistant      Endings: None Reported    Modes of Intervention: Education      Discipline Responsible: Psychoeducational Specialist      Signature:  Fawad Humphrey

## 2020-06-25 NOTE — PROGRESS NOTES
Progress Note  Abad Vigil  6/25/2020 8:03 AM  Subjective:   Admit Date:   6/23/2020      CC/ADMIT DX:       Interval History:   Reviewed overnight events and nursing notes. No new physical complaints. I have reviewed all labs/diagnostics from the last 24hrs. ROS:   I have done a 10 point ROS and all are negative, except what is mentioned in the HPI. DIET LOW FAT; Medications:      mirtazapine  7.5 mg Oral Nightly    melatonin  3 mg Oral Nightly    apixaban  5 mg Oral BID    donepezil  10 mg Oral Nightly    ipratropium  0.5 mg Nebulization 4x Daily    memantine  10 mg Oral BID    montelukast  10 mg Oral Daily    sertraline  100 mg Oral Daily    levoFLOXacin  250 mg Oral Daily           Objective:   Vitals: /76   Pulse 78   Temp 99.5 °F (37.5 °C) (Temporal)   Resp 18   Ht 5' 5\" (1.651 m)   Wt 172 lb 6.4 oz (78.2 kg)   SpO2 92%   BMI 28.69 kg/m²  No intake or output data in the 24 hours ending 06/25/20 0803  General appearance: alert and cooperative with exam  Extremities: extremities normal, atraumatic, no cyanosis or edema  Neurologic:  No obvious focal neurologic deficits. Skin: no rashes    Assessment and Plan:   Depression  UTI    Plan:  1. Continue present medication(s)   2. Follow with Psych  3. He is medically stable. I will monitor for any changes or concerns. Discharge planning:  home     Reviewed treatment plans with the patient and/or family.              Electronically signed by Maya Smith MD on 6/25/2020 at 8:03 AM

## 2020-06-25 NOTE — PROGRESS NOTES
Pt up this morning with prompting. Pt is calm and cooperative, pleasant during interaction. Pt reports having slept well last night. Pt reports that his concentration is improving. Pt reports that anxiety and depression are both improving. Pt denies any hallucinations, suicidal thoughts or anxiety attacks. No distress noted. Will continue to monitor.

## 2020-06-25 NOTE — PROGRESS NOTES
SW met with treatment team to discuss pt's progress and setbacks. SW 2 was present. Pt reportedly slept well last night, with medications, appetite is good, attends scheduled group activities, social with peers/staff, performs ADL's, compliant with medications, behavior has been cooperative, denies depression/anxiety, denies SI/HI/AVH, continue current treatment plan.

## 2020-06-25 NOTE — PROGRESS NOTES
Department of Psychiatry  Attending Progress Note - Geriatric      SUBJECTIVE:    66years old male with previous psychiatric history of depression, complicated by history of bladder cancer, currently has urostomy, who has been admitted to psychiatric unit secondary worsening of the depression. Patient has been seen in treatment team room with presence of . Patient is alert and oriented on current situation, day, date, month and a year. Patient reported that his condition mildly improved since admission and his mood is \"a little better\" today. He endorses good appetite and improved quality of sleep, stated that he did not experience any difficulties to fall asleep or stay asleep during the last night. Patient is compliant with currently prescribed medications and denies any side effect. He continues to report mild feeling of depression and anxiety, and rated both of them as 1-2 out of 10, with 10 being the worst.  He continues to report feeling of hopelessness after ending up long-term relationships. However, patient stated that he feels positive and will try to reassess his life priorities. Patient denies any issues with his urostomy, stated that it is functioning well. Patient attends group activities in the unit and participates in them. He became more social with medical staff and other patients in the unit. He performed his ADLs today. Currently patient denies any active suicidal or homicidal ideations, denies any plans. Also, he denies any auditory and visual hallucinations. OBJECTIVE    Physical  VITALS:  /89   Pulse 110   Temp 97.9 °F (36.6 °C) (Temporal)   Resp 20   Ht 5' 5\" (1.651 m)   Wt 172 lb 6.4 oz (78.2 kg)   SpO2 92%   BMI 28.69 kg/m²   TEMPERATURE:  Current - Temp: 97.9 °F (36.6 °C);  Max - Temp  Av.7 °F (37.1 °C)  Min: 97.9 °F (36.6 °C)  Max: 99.5 °F (37.5 °C)  RESPIRATIONS RANGE: Resp  Av  Min: 18  Max: 20  PULSE RANGE: Pulse  Av  Min: 66  Max: 110  BLOOD PRESSURE RANGE:  Systolic (21GTM), NFE:930 , Min:103 , WXR:039   ; Diastolic (03IBU), IIS:89, Min:76, Max:89    PULSE OXIMETRY RANGE: SpO2  Av %  Min: 92 %  Max: 92 %    Review of Systems: 14 point review of systems is negative    Psychological ROS: Positive for depression and anxiety    Mental Status Examination:   Appearance: Appropriately groomed and in hospital attire. Made good eye contact. Behavior: Calm, cooperative, friendly, and socially appropriate. Mild psychomotor retardation appreciated. Gait unremarkable. Speech: Normal in tone, volume, and quality. Mood: \"A little better\"   Affect: Mood congruent. Range is flat and restricted. Thought Process: Appears linear and goal oriented. Thought Content: Patient does not have any current active suicidal and   homicidal ideations. No overt delusions or paranoia appreciated. Perceptions: Seems patient does not have any auditory or visual hallucinations at present time. Patient did not appear to be responding to internal stimuli. No overt psychosis. Orientation: to person, place, date, and situation. Alert. Language: Intact. Fund of information: Intact. Memory: recent and remote appear intact. Impulsivity: Improved  Neurovegitative: Fair appetite, improved sleep.    Insight: Present  Judgment: Limited      Data  Lab Results   Component Value Date    TSHFT4 1.78 2020     Lab Results   Component Value Date    [id number] 005 2020     Lab Results   Component Value Date    VITD25 48.3 2020       Medications  Current Facility-Administered Medications: mirtazapine (REMERON) tablet 7.5 mg, 7.5 mg, Oral, Nightly  melatonin tablet 3 mg, 3 mg, Oral, Nightly  traZODone (DESYREL) tablet 25 mg, 25 mg, Oral, Nightly PRN  apixaban (ELIQUIS) tablet 5 mg, 5 mg, Oral, BID  donepezil (ARICEPT) tablet 10 mg, 10 mg, Oral, Nightly  ipratropium (ATROVENT) 0.02 % nebulizer solution 0.5 mg, 0.5 mg, Nebulization, 4x

## 2020-06-25 NOTE — PLAN OF CARE
Group Therapy Note    Date: 6/25/2020  Start Time: 1500  End Time:  1600  Number of Participants: 3    Type of Group: Recovery    Wellness Binder Information  Module Name:  Women's Issues  Session Number:  1    Group Goal for Pt: To raise awareness of the effectiveness of assertive communication    Notes:  Pt did not attend group discussion. Pt was invited/encouraged. Status After Intervention:      Participation Level:     Participation Quality:       Speech:         Thought Process/Content:       Affective Functioning:     Mood:       Level of consciousness:        Response to Learning:       Endings:     Modes of Intervention:       Discipline Responsible:       Signature:  Emerita Zamudio

## 2020-06-26 PROBLEM — F33.2 MAJOR DEPRESSIVE DISORDER, RECURRENT SEVERE WITHOUT PSYCHOTIC FEATURES (HCC): Status: ACTIVE | Noted: 2020-06-26

## 2020-06-26 PROCEDURE — 6370000000 HC RX 637 (ALT 250 FOR IP): Performed by: PSYCHIATRY & NEUROLOGY

## 2020-06-26 PROCEDURE — 6360000002 HC RX W HCPCS: Performed by: PSYCHIATRY & NEUROLOGY

## 2020-06-26 PROCEDURE — 1240000000 HC EMOTIONAL WELLNESS R&B

## 2020-06-26 PROCEDURE — 94640 AIRWAY INHALATION TREATMENT: CPT

## 2020-06-26 PROCEDURE — 99232 SBSQ HOSP IP/OBS MODERATE 35: CPT | Performed by: PSYCHIATRY & NEUROLOGY

## 2020-06-26 PROCEDURE — 6370000000 HC RX 637 (ALT 250 FOR IP): Performed by: FAMILY MEDICINE

## 2020-06-26 RX ORDER — TRAZODONE HYDROCHLORIDE 50 MG/1
50 TABLET ORAL NIGHTLY
Status: DISCONTINUED | OUTPATIENT
Start: 2020-06-26 | End: 2020-06-28

## 2020-06-26 RX ADMIN — MELATONIN 3 MG: at 20:57

## 2020-06-26 RX ADMIN — APIXABAN 5 MG: 5 TABLET, FILM COATED ORAL at 20:57

## 2020-06-26 RX ADMIN — LEVOFLOXACIN 250 MG: 250 TABLET, FILM COATED ORAL at 17:45

## 2020-06-26 RX ADMIN — IPRATROPIUM BROMIDE 0.5 MG: 0.5 SOLUTION RESPIRATORY (INHALATION) at 16:12

## 2020-06-26 RX ADMIN — MEMANTINE HYDROCHLORIDE 10 MG: 5 TABLET ORAL at 20:57

## 2020-06-26 RX ADMIN — MEMANTINE HYDROCHLORIDE 10 MG: 5 TABLET ORAL at 08:46

## 2020-06-26 RX ADMIN — IPRATROPIUM BROMIDE 0.5 MG: 0.5 SOLUTION RESPIRATORY (INHALATION) at 07:22

## 2020-06-26 RX ADMIN — IPRATROPIUM BROMIDE 0.5 MG: 0.5 SOLUTION RESPIRATORY (INHALATION) at 10:41

## 2020-06-26 RX ADMIN — MIRTAZAPINE 7.5 MG: 7.5 TABLET ORAL at 20:56

## 2020-06-26 RX ADMIN — SERTRALINE HYDROCHLORIDE 100 MG: 100 TABLET ORAL at 08:46

## 2020-06-26 RX ADMIN — MONTELUKAST SODIUM 10 MG: 10 TABLET, COATED ORAL at 08:46

## 2020-06-26 RX ADMIN — DONEPEZIL HYDROCHLORIDE 10 MG: 5 TABLET, FILM COATED ORAL at 20:57

## 2020-06-26 RX ADMIN — APIXABAN 5 MG: 5 TABLET, FILM COATED ORAL at 08:46

## 2020-06-26 RX ADMIN — IPRATROPIUM BROMIDE 0.5 MG: 0.5 SOLUTION RESPIRATORY (INHALATION) at 18:20

## 2020-06-26 RX ADMIN — TRAZODONE HYDROCHLORIDE 50 MG: 50 TABLET ORAL at 20:57

## 2020-06-26 ASSESSMENT — PAIN SCALES - GENERAL
PAINLEVEL_OUTOF10: 0
PAINLEVEL_OUTOF10: 0

## 2020-06-26 NOTE — PROGRESS NOTES
Department of Psychiatry  Attending Progress Note     Chief complaint: \"I didn't sleep much\"    SUBJECTIVE:   Chart reviewed, discussed with the team.  No major issues overnight. Patient is compliant with treatment and performs ADLs. Participation in group activities is limited due to communication barriers. Patient still does not have his hearing aids. Patient presents with somewhat brighter affect this morning. He is oriented to person, place, date and situation. States depression has somewhat improved, however, he is still sleeping poorly. Appetite remains poor as well. Patient denies suicidal ideation. Hoping to get his hearing aids today. Feels that he is not ready to go home yet. OBJECTIVE    Physical  Wt Readings from Last 3 Encounters:   06/23/20 172 lb 6.4 oz (78.2 kg)     Temp Readings from Last 3 Encounters:   06/26/20 97.8 °F (36.6 °C) (Temporal)     BP Readings from Last 3 Encounters:   06/26/20 (!) 131/93     Pulse Readings from Last 3 Encounters:   06/26/20 116        Review of Systems: 14-point review of systems negative except as described above    Mental Status Examination:   Appearance: Stated age, in hospital attire. Behavior: Calm, cooperative, smiling at times. Speech: Normal in tone, volume, and quality. No slurring, dysarthria or pressured speech noted. Mood: \" Better\"   Affect: Brighter  Thought Process: Appears linear. Thought Content: Denies suicidal and homicidal ideation. No overt delusions or paranoia appreciated. Perceptions: Denies auditory or visual hallucinations at present time. Not responding to internal stimuli. Concentration: Intact. Orientation: to person, place, date, and situation. Language: Intact. Fund of information: Intact. Memory: Recent and remote appear intact. Neurovegitative: Poor appetite and sleep. Insight: Limited. Judgment: Limited.     Data  Lab Results   Component Value Date    WBC 7.4 06/23/2020    HGB 14.9 06/23/2020

## 2020-06-26 NOTE — PROGRESS NOTES
[facility] Daily Shift Assessment-Geriatric Unit  Nursing Progress Note          Room: [facility]/618-01   Name: Albert Boss   Age: 66 y.o. Gender: male   Dx: <principal problem not specified>  Precautions: suicide risk and fall risk  Inpatient Status: voluntary     SLEEP:    Sleep: Yes,   Sleep Quality Fair   Hours Slept:    Sleep Medications: Yes  PRN Sleep Meds: No       MEDICAL:      Other PRN Meds: No   Med Compliant: Yes   Accu-Chek: No   Oxygen/CPAP/BiPAP: No  CIWA/CINA: No   PAIN Assessment: none  Side Effects from medication: No      PSYCH:     SI denies suicidal ideation    HI Negative for homicidal ideation        AVH:Absent      Depression: 7-8 Anxiety: low       GENERAL:      Appetite: no change from normal  Social: No Speech: normal   Appearance:appropriately dressed  Assistive Devices: noneLevel of Assist: Independent      GROUP:    Group Participation: Yes  Participation LevelActive Listener    Participation QualityAppropriate    Notes:   Patient has been in his room this whole shift. He has been alert and oriented. Able to voice his needs. He reports that his depression is going to take a while to get better. He reports that his anxiety is low. Patient has a urostomy on his right side. Patient is independent with care to his urostomy. He empties his urostomy himself. He ambulates without difficulty. Patient is hard of hearing but able to answer assessment questions appropriately.          Electronically signed by Thang Toussaint RN on 6/26/20 at 1:20 AM CDT

## 2020-06-26 NOTE — PROGRESS NOTES
SW met with treatment team to discuss pt's progress and setbacks. SW 2 was present. Pt reportedly slept fair last night, with medications, appetite is good, attends scheduled group activities, is [place] and has some difficulty hearing, isolative to self, performs ADL's, compliant with medications, behavior has been cooperative, reports severe depression, mild anxiety, denies SI/HI/AVH, continue current treatment plan.

## 2020-06-26 NOTE — PROGRESS NOTES
WRAP UP GROUP NOTE:     Patient's Goal:  Providing feedback as to their own progress in the care-plan provided. Pt's have an opportunity to explore self-reflective skills and share any additional cares and concerns not yet addressed. Pt effectively participated.      Energy level:LOW  Appetite:GOOD  Concentration: GOOD  Hallucinations:BLANK  Depression:BLANK  Anxiety:BLANK  How I worked today:BLANK  What helps me sleep:BLANK  Any questions/complaints/comments:BLANK DISPLAY PLAN FREE TEXT

## 2020-06-27 LAB
ANION GAP SERPL CALCULATED.3IONS-SCNC: 9 MMOL/L (ref 7–19)
BUN BLDV-MCNC: 22 MG/DL (ref 8–23)
CALCIUM SERPL-MCNC: 8.5 MG/DL (ref 8.8–10.2)
CHLORIDE BLD-SCNC: 106 MMOL/L (ref 98–111)
CO2: 27 MMOL/L (ref 22–29)
CREAT SERPL-MCNC: 1.4 MG/DL (ref 0.5–1.2)
GFR NON-AFRICAN AMERICAN: 49
GLUCOSE BLD-MCNC: 99 MG/DL (ref 74–109)
POTASSIUM SERPL-SCNC: 4.6 MMOL/L (ref 3.5–5)
SODIUM BLD-SCNC: 142 MMOL/L (ref 136–145)

## 2020-06-27 PROCEDURE — 6370000000 HC RX 637 (ALT 250 FOR IP): Performed by: PSYCHIATRY & NEUROLOGY

## 2020-06-27 PROCEDURE — 94640 AIRWAY INHALATION TREATMENT: CPT

## 2020-06-27 PROCEDURE — 36415 COLL VENOUS BLD VENIPUNCTURE: CPT

## 2020-06-27 PROCEDURE — 80048 BASIC METABOLIC PNL TOTAL CA: CPT

## 2020-06-27 PROCEDURE — 6370000000 HC RX 637 (ALT 250 FOR IP): Performed by: FAMILY MEDICINE

## 2020-06-27 PROCEDURE — 6360000002 HC RX W HCPCS: Performed by: PSYCHIATRY & NEUROLOGY

## 2020-06-27 PROCEDURE — 1240000000 HC EMOTIONAL WELLNESS R&B

## 2020-06-27 RX ORDER — FLUTICASONE PROPIONATE 50 MCG
2 SPRAY, SUSPENSION (ML) NASAL DAILY
Status: DISCONTINUED | OUTPATIENT
Start: 2020-06-27 | End: 2020-06-29 | Stop reason: HOSPADM

## 2020-06-27 RX ORDER — GUAIFENESIN 600 MG/1
1200 TABLET, EXTENDED RELEASE ORAL 2 TIMES DAILY
Status: DISCONTINUED | OUTPATIENT
Start: 2020-06-27 | End: 2020-06-29 | Stop reason: HOSPADM

## 2020-06-27 RX ADMIN — FLUTICASONE PROPIONATE 2 SPRAY: 50 SPRAY, METERED NASAL at 16:12

## 2020-06-27 RX ADMIN — MEMANTINE HYDROCHLORIDE 10 MG: 5 TABLET ORAL at 21:07

## 2020-06-27 RX ADMIN — SERTRALINE HYDROCHLORIDE 50 MG: 50 TABLET ORAL at 08:37

## 2020-06-27 RX ADMIN — GUAIFENESIN 1200 MG: 600 TABLET, EXTENDED RELEASE ORAL at 21:08

## 2020-06-27 RX ADMIN — LEVOFLOXACIN 250 MG: 250 TABLET, FILM COATED ORAL at 17:27

## 2020-06-27 RX ADMIN — IPRATROPIUM BROMIDE 0.5 MG: 0.5 SOLUTION RESPIRATORY (INHALATION) at 10:48

## 2020-06-27 RX ADMIN — GUAIFENESIN 1200 MG: 600 TABLET, EXTENDED RELEASE ORAL at 13:56

## 2020-06-27 RX ADMIN — MONTELUKAST SODIUM 10 MG: 10 TABLET, COATED ORAL at 08:37

## 2020-06-27 RX ADMIN — TRAZODONE HYDROCHLORIDE 50 MG: 50 TABLET ORAL at 21:07

## 2020-06-27 RX ADMIN — IPRATROPIUM BROMIDE 0.5 MG: 0.5 SOLUTION RESPIRATORY (INHALATION) at 07:16

## 2020-06-27 RX ADMIN — MEMANTINE HYDROCHLORIDE 10 MG: 5 TABLET ORAL at 08:37

## 2020-06-27 RX ADMIN — APIXABAN 5 MG: 5 TABLET, FILM COATED ORAL at 08:37

## 2020-06-27 RX ADMIN — DONEPEZIL HYDROCHLORIDE 10 MG: 5 TABLET, FILM COATED ORAL at 21:08

## 2020-06-27 RX ADMIN — IPRATROPIUM BROMIDE 0.5 MG: 0.5 SOLUTION RESPIRATORY (INHALATION) at 14:55

## 2020-06-27 RX ADMIN — APIXABAN 5 MG: 5 TABLET, FILM COATED ORAL at 21:07

## 2020-06-27 RX ADMIN — MELATONIN 3 MG: at 21:07

## 2020-06-27 RX ADMIN — MIRTAZAPINE 7.5 MG: 7.5 TABLET ORAL at 23:04

## 2020-06-27 RX ADMIN — IPRATROPIUM BROMIDE 0.5 MG: 0.5 SOLUTION RESPIRATORY (INHALATION) at 18:16

## 2020-06-27 ASSESSMENT — PAIN SCALES - GENERAL: PAINLEVEL_OUTOF10: 0

## 2020-06-27 NOTE — PLAN OF CARE
Problem: Altered Mood, Depressive Behavior:  Goal: Able to verbalize and/or display a decrease in depressive symptoms  Description: Able to verbalize and/or display a decrease in depressive symptoms  6/27/2020 1145 by Lucy Kwon LPC  Outcome: Ongoing  Note:                                                                     Group Therapy Note    Date: 6/27/2020  Start Time: 1000  End Time:  1130  Number of Participants: 3    Type of Group: Psychotherapy    Patient's Goal: Pt will attend group as scheduled, identify an issue pt would like to work on regarding bettering relationships with others and/or self, pt will be participate in group discussion. Note: Pt did not attend group as scheduled. Pt was invited and encouraged to attend, however pt refused/declined. Nursing notified.      Discipline Responsible: /Counselor      Signature:  Lucy Kwon [facility] [place]

## 2020-06-27 NOTE — H&P
HISTORY and PHYSICAL      CHIEF COMPLAINT:  Depression    Reason for Admission:  Depression    History Obtained From:  patient    HISTORY OF PRESENT ILLNESS:      The patient is a 66 y.o. male who is admitted to the [place] [address] unit with worsening mood issues. He has no c/o CP or SOA. No abdominal pain or N/V. No dysuria. No fevers. Past Medical History:        Diagnosis Date    Dementia ([facility] [address]     Depression     History of creation of ostomy [facility])      Past Surgical History:    History reviewed. No pertinent surgical history. Medications Prior to Admission:    Medications Prior to Admission: sertraline (ZOLOFT) 50 MG tablet, Take 100 mg by mouth daily Indications: TAKE AT 7 PM.  sertraline (ZOLOFT) 25 MG tablet, Take 25 mg by mouth daily Indications: TAKE AT 7PM  Memantine HCl (NAMENDA PO), Take by mouth 2 times daily  Cetirizine HCl (ZYRTEC ALLERGY PO), Take by mouth  Apixaban (ELIQUIS PO), Take by mouth 2 times daily  METOPROLOL TARTRATE PO, Take by mouth 2 times daily  Montelukast Sodium (SINGULAIR PO), Take by mouth daily  TRAZODONE HCL PO, Take by mouth nightly  ipratropium (ATROVENT) 0.02 % nebulizer solution, Take 0.5 mg by nebulization 4 times daily  Donepezil HCl (ARICEPT PO), Take by mouth nightly  cefdinir (OMNICEF) 300 MG capsule, Take 300 mg by mouth 2 times daily    Allergies:  Patient has no known allergies. Social History:   TOBACCO:   reports that he has never smoked. He has never used smokeless tobacco.  ETOH:   reports no history of alcohol use. DRUGS:   reports no history of drug use. Family History:   History reviewed. No pertinent family history.   REVIEW OF SYSTEMS:  Constitutional: neg  CV: neg  Pulmonary: neg  GI: neg  : neg  Psych: depression  Neuro: neg  Skin: neg  MusculoSkeletal: neg  HEENT: neg  Joints: neg    Vitals:  BP (!) 134/93   Pulse 105   Temp 97.3 °F (36.3 °C) (Temporal)   Resp 16   Ht 5' 5\" (1.651 m) Wt 172 lb 6.4 oz (78.2 kg)   SpO2 96%   BMI 28.69 kg/m²     PHYSICAL EXAM:  Gen: NAD, alert  HEENT: WNL  Lymph: no LAD  Neck: no JVD or masses  Chest: CTA bilat  CV: RRR  Abdomen: NT/ND  Extrem: no C/C/E  Neuro: non focal  Skin: no rashes  Joints: no redness    DATA:  I have reviewed the admission labs and imaging tests.     ASSESSMENT AND PLAN:      Active Problems:    Major depressive disorder, recurrent severe without psychotic features---follow with Psych    Pyuria--follow culture    H/O Bladder CA      Bean Ram MD  1:39 PM 6/27/2020

## 2020-06-27 NOTE — PLAN OF CARE
Problem: Altered Mood, Depressive Behavior:  Goal: Able to verbalize acceptance of life and situations over which he or she has no control  Description: Able to verbalize acceptance of life and situations over which he or she has no control  6/27/2020 0826 by Lainey Gordon RN  Outcome: Ongoing  6/26/2020 2329 by Samra Valdez RN  Outcome: Ongoing  Goal: Able to verbalize and/or display a decrease in depressive symptoms  Description: Able to verbalize and/or display a decrease in depressive symptoms  6/27/2020 0826 by Lainey Gordon RN  Outcome: Ongoing  6/26/2020 2329 by Samra Valdez RN  Outcome: Ongoing  Goal: Ability to disclose and discuss suicidal ideas will improve  Description: Ability to disclose and discuss suicidal ideas will improve  6/27/2020 0826 by Lainey Gordon RN  Outcome: Ongoing  6/26/2020 2329 by Samra Valdez RN  Outcome: Ongoing  Goal: Able to verbalize support systems  Description: Able to verbalize support systems  6/27/2020 0826 by Lainey Gordon RN  Outcome: Ongoing  6/26/2020 2329 by Samra Valdez RN  Outcome: Ongoing  Goal: Absence of self-harm  Description: Absence of self-harm  6/27/2020 0826 by Lainey Gordon RN  Outcome: Ongoing  6/26/2020 2329 by Samra Valdez RN  Outcome: Ongoing  Goal: Patient specific goal  Description: Patient specific goal  6/27/2020 0826 by Lainey Gordon RN  Outcome: Ongoing  6/26/2020 2329 by Samra Valdez RN  Outcome: Ongoing  Goal: Participates in care planning  Description: Participates in care planning  6/27/2020 0826 by Lainey Gordon RN  Outcome: Ongoing  6/26/2020 2329 by Samra Valdez RN  Outcome: Ongoing     Problem: Falls - Risk of:  Goal: Will remain free from falls  Description: Will remain free from falls  6/27/2020 0826 by Lainey Gordno RN  Outcome: Ongoing  6/26/2020 2329 by Samra Valdez RN  Outcome: Ongoing  Goal: Absence of physical injury  Description: Absence of physical injury  6/27/2020 0826 by Lainey Gordon RN  Outcome: Ongoing  6/26/2020 2329 by Adelaida Zepeda RN  Outcome: Ongoing     Problem: Anxiety:  Goal: Level of anxiety will decrease  Description: Level of anxiety will decrease  6/27/2020 0826 by Woodson Phoenix, RN  Outcome: Ongoing  6/26/2020 2329 by Adelaida Zepeda RN  Outcome: Ongoing     Problem: Pain:  Goal: Pain level will decrease  Description: Pain level will decrease  6/27/2020 0826 by Woodson Phoenix, RN  Outcome: Ongoing  6/26/2020 2329 by Adelaida Zepeda RN  Outcome: Ongoing  Goal: Control of acute pain  Description: Control of acute pain  6/27/2020 0826 by Woodson Phoenix, RN  Outcome: Ongoing  6/26/2020 2329 by Adelaida Zepeda RN  Outcome: Ongoing  Goal: Control of chronic pain  Description: Control of chronic pain  6/27/2020 0826 by Woodson Phoenix, RN  Outcome: Ongoing  6/26/2020 2329 by Adelaida Zepeda RN  Outcome: Ongoing     Problem: Health Behavior:  Goal: Ability to verbalize adaptive coping strategies to use when suicidal feelings occur will improve  Description: Ability to verbalize adaptive coping strategies to use when suicidal feelings occur will improve  6/27/2020 0826 by Woodson Phoenix, RN  Outcome: Ongoing  6/26/2020 2329 by Adelaida Zepeda RN  Outcome: Ongoing  Goal: Ability to verbalize adaptive coping strategies to use when the urge to self-mutilate occurs will improve  Description: Ability to verbalize adaptive coping strategies to use when the urge to self-mutilate occurs will improve  6/27/2020 0826 by Woodson Phoenix, RN  Outcome: Ongoing  6/26/2020 2329 by Adelaida Zepeda RN  Outcome: Ongoing     Problem: Safety:  Goal: Ability to contract for his/her safety will improve  Description: Ability to contract for his/her safety will improve  6/27/2020 0826 by Woodson Phoenix, RN  Outcome: Ongoing  6/26/2020 2329 by Adelaida Zepeda RN  Outcome: Ongoing

## 2020-06-27 NOTE — PROGRESS NOTES
Progress Note  Thomas Lugo  6/27/2020 12:18 AM  Subjective:   Admit Date:   6/23/2020      CC/ADMIT DX:       Interval History:   Reviewed overnight events and nursing notes. He has no new physical complaints. I have reviewed all labs/diagnostics from the last 24hrs. ROS:   I have done a 10 point ROS and all are negative, except what is mentioned in the HPI. DIET LOW FAT; Medications:      traZODone  50 mg Oral Nightly    sertraline  50 mg Oral Daily    mirtazapine  7.5 mg Oral Nightly    melatonin  3 mg Oral Nightly    apixaban  5 mg Oral BID    donepezil  10 mg Oral Nightly    ipratropium  0.5 mg Nebulization 4x Daily    memantine  10 mg Oral BID    montelukast  10 mg Oral Daily    levoFLOXacin  250 mg Oral Daily           Objective:   Vitals: /80   Pulse 96   Temp 98.9 °F (37.2 °C) (Temporal)   Resp 18   Ht 5' 5\" (1.651 m)   Wt 172 lb 6.4 oz (78.2 kg)   SpO2 94%   BMI 28.69 kg/m²  No intake or output data in the 24 hours ending 06/27/20 0018  General appearance: alert and cooperative with exam  Extremities: extremities normal, atraumatic, no cyanosis or edema  Neurologic:  No obvious focal neurologic deficits. Skin: no rashes    Assessment and Plan:   Depression  UTI    Plan:  1. Continue present medication(s)   2. Follow with Psych  3. He is medically stable. I will monitor for any changes or new concerns. Discharge planning:  home     Reviewed treatment plans with the patient and/or family.              Electronically signed by Abigail Pablo MD on 6/27/2020 at 12:18 AM

## 2020-06-27 NOTE — PROGRESS NOTES
Progress Note  Jacey Pozo  6/27/2020 1:32 PM  Subjective:   Admit Date:   6/23/2020      CC/ADMIT DX:       Interval History:   Reviewed overnight events and nursing notes. He c/o PND and congestion. I have reviewed all labs/diagnostics from the last 24hrs. ROS:   I have done a 10 point ROS and all are negative, except what is mentioned in the HPI. DIET LOW FAT; Medications:      guaiFENesin  1,200 mg Oral BID    fluticasone  2 spray Each Nostril Daily    traZODone  50 mg Oral Nightly    sertraline  50 mg Oral Daily    mirtazapine  7.5 mg Oral Nightly    melatonin  3 mg Oral Nightly    apixaban  5 mg Oral BID    donepezil  10 mg Oral Nightly    ipratropium  0.5 mg Nebulization 4x Daily    memantine  10 mg Oral BID    montelukast  10 mg Oral Daily    levoFLOXacin  250 mg Oral Daily           Objective:   Vitals: BP (!) 134/93   Pulse 105   Temp 97.3 °F (36.3 °C) (Temporal)   Resp 16   Ht 5' 5\" (1.651 m)   Wt 172 lb 6.4 oz (78.2 kg)   SpO2 96%   BMI 28.69 kg/m²  No intake or output data in the 24 hours ending 06/27/20 1332  General appearance: alert and cooperative with exam  Extremities: extremities normal, atraumatic, no cyanosis or edema  Neurologic:  No obvious focal neurologic deficits. Skin: no rashes    Assessment and Plan:   Depression  UTI    Plan:  1. Continue present medication(s)   2. Follow with Psych  3. Add Mucinex and Flonase      Discharge planning:  home     Reviewed treatment plans with the patient and/or family.              Electronically signed by Natalie Gamez MD on 6/27/2020 at 1:32 PM

## 2020-06-27 NOTE — PROGRESS NOTES
Pt is very pleasant and cooperative. Pt reports having \"low energy. \" Pt rated sleep as \"fair\" after having difficulty falling asleep. Pt rated his concentration as \"good\". Pt rated depression \"6\" on scale of 1-10 and anxiety \"4\" and reports that these are improving. Pt denied any hallucinations, suicidal thoughts or anxiety attacks. No distress noted. Pt is looking forward to discharge home. No distress noted. Will continue to monitor.

## 2020-06-27 NOTE — PLAN OF CARE
Problem: Altered Mood, Depressive Behavior:  Goal: Able to verbalize acceptance of life and situations over which he or she has no control  Description: Able to verbalize acceptance of life and situations over which he or she has no control  Outcome: Ongoing     Problem: Altered Mood, Depressive Behavior:  Goal: Able to verbalize and/or display a decrease in depressive symptoms  Description: Able to verbalize and/or display a decrease in depressive symptoms  Outcome: Ongoing     Problem: Altered Mood, Depressive Behavior:  Goal: Ability to disclose and discuss suicidal ideas will improve  Description: Ability to disclose and discuss suicidal ideas will improve  Outcome: Ongoing     Problem: Altered Mood, Depressive Behavior:  Goal: Able to verbalize support systems  Description: Able to verbalize support systems  Outcome: Ongoing     Problem: Altered Mood, Depressive Behavior:  Goal: Absence of self-harm  Description: Absence of self-harm  Outcome: Ongoing     Problem: Altered Mood, Depressive Behavior:  Goal: Patient specific goal  Description: Patient specific goal  Outcome: Ongoing     Problem: Altered Mood, Depressive Behavior:  Goal: Participates in care planning  Description: Participates in care planning  Outcome: Ongoing     Problem: Falls - Risk of:  Goal: Will remain free from falls  Description: Will remain free from falls  Outcome: Ongoing  Goal: Absence of physical injury  Description: Absence of physical injury  Outcome: Ongoing     Problem: Anxiety:  Goal: Level of anxiety will decrease  Description: Level of anxiety will decrease  Outcome: Ongoing     Problem: Pain:  Description: Pain management should include both nonpharmacologic and pharmacologic interventions.   Goal: Pain level will decrease  Description: Pain level will decrease  Outcome: Ongoing  Goal: Control of acute pain  Description: Control of acute pain  Outcome: Ongoing  Goal: Control of chronic pain  Description: Control of chronic pain  Outcome: Ongoing     Problem: Health Behavior:  Goal: Ability to verbalize adaptive coping strategies to use when suicidal feelings occur will improve  Description: Ability to verbalize adaptive coping strategies to use when suicidal feelings occur will improve  Outcome: Ongoing  Goal: Ability to verbalize adaptive coping strategies to use when the urge to self-mutilate occurs will improve  Description: Ability to verbalize adaptive coping strategies to use when the urge to self-mutilate occurs will improve  Outcome: Ongoing     Problem: Safety:  Goal: Ability to contract for his/her safety will improve  Description: Ability to contract for his/her safety will improve  Outcome: Ongoing

## 2020-06-27 NOTE — PROGRESS NOTES
I Daily Shift Assessment-Geriatric Unit  Nursing Progress Note          [place] [facility] [place] [facility] [place]   Name: Albert Boss   Age: 66 y.o. Gender: male   Dx: <principal problem not specified>  Precautions: suicide risk and fall risk  Inpatient Status: voluntary     SLEEP:    Sleep: Yes,   Sleep Quality Fair   Hours Slept:    Sleep Medications: Yes  PRN Sleep Meds: No       MEDICAL:      Other PRN Meds: No   Med Compliant: Yes   Accu-Chek: No   Oxygen/CPAP/BiPAP: No  CIWA/CINA: No   PAIN Assessment: none  Side Effects from medication: No      PSYCH:     SI denies suicidal ideation    HI Negative for homicidal ideation        AVH:Absent      Depression: IMPROVED Anxiety: IMPROVED       GENERAL:      Appetite: no change from normal  Social: No Speech: normal, [place]   Appearance:appropriately dressed  Assistive Devices: noneLevel of Assist: Independent      GROUP:    Group Participation: Yes  Participation LevelActive Listener    Participation QualityAppropriate    Notes:   Patient has been in bed this shift. He is hard of hearing. He requested his sister Baljeet Reveles be called to bring his hearing aides and urostomy supplies. Patient had signed a PHI for Baljeet Reveles. Patient said that he would change his urostomy today when his sister brought his supplies in. Patient was having trouble falling asleep this evening.  He received Melatonin 3 mg, Trazodone 50 and Remeron 7.5 mg.        Electronically signed by Thang Toussaint RN on 6/27/20 at 12:53 AM CDT

## 2020-06-28 PROCEDURE — 6360000002 HC RX W HCPCS: Performed by: PSYCHIATRY & NEUROLOGY

## 2020-06-28 PROCEDURE — 1240000000 HC EMOTIONAL WELLNESS R&B

## 2020-06-28 PROCEDURE — 99231 SBSQ HOSP IP/OBS SF/LOW 25: CPT | Performed by: PSYCHIATRY & NEUROLOGY

## 2020-06-28 PROCEDURE — 6370000000 HC RX 637 (ALT 250 FOR IP): Performed by: FAMILY MEDICINE

## 2020-06-28 PROCEDURE — 6370000000 HC RX 637 (ALT 250 FOR IP): Performed by: PSYCHIATRY & NEUROLOGY

## 2020-06-28 PROCEDURE — 94640 AIRWAY INHALATION TREATMENT: CPT

## 2020-06-28 RX ORDER — HYDROXYZINE HYDROCHLORIDE 10 MG/1
10 TABLET, FILM COATED ORAL 3 TIMES DAILY PRN
Status: DISCONTINUED | OUTPATIENT
Start: 2020-06-28 | End: 2020-06-29 | Stop reason: HOSPADM

## 2020-06-28 RX ORDER — TRAZODONE HYDROCHLORIDE 150 MG/1
75 TABLET ORAL NIGHTLY
Status: DISCONTINUED | OUTPATIENT
Start: 2020-06-28 | End: 2020-06-29 | Stop reason: HOSPADM

## 2020-06-28 RX ORDER — DIAPER,BRIEF,INFANT-TODD,DISP
EACH MISCELLANEOUS 2 TIMES DAILY
Status: DISCONTINUED | OUTPATIENT
Start: 2020-06-28 | End: 2020-06-29 | Stop reason: HOSPADM

## 2020-06-28 RX ADMIN — GUAIFENESIN 1200 MG: 600 TABLET, EXTENDED RELEASE ORAL at 21:09

## 2020-06-28 RX ADMIN — MEMANTINE HYDROCHLORIDE 10 MG: 5 TABLET ORAL at 08:13

## 2020-06-28 RX ADMIN — MEMANTINE HYDROCHLORIDE 10 MG: 5 TABLET ORAL at 21:09

## 2020-06-28 RX ADMIN — IPRATROPIUM BROMIDE 0.5 MG: 0.5 SOLUTION RESPIRATORY (INHALATION) at 13:00

## 2020-06-28 RX ADMIN — MONTELUKAST SODIUM 10 MG: 10 TABLET, COATED ORAL at 08:13

## 2020-06-28 RX ADMIN — DONEPEZIL HYDROCHLORIDE 10 MG: 5 TABLET, FILM COATED ORAL at 21:09

## 2020-06-28 RX ADMIN — TRAZODONE HYDROCHLORIDE 75 MG: 150 TABLET ORAL at 21:08

## 2020-06-28 RX ADMIN — GUAIFENESIN 1200 MG: 600 TABLET, EXTENDED RELEASE ORAL at 08:13

## 2020-06-28 RX ADMIN — LEVOFLOXACIN 250 MG: 250 TABLET, FILM COATED ORAL at 16:55

## 2020-06-28 RX ADMIN — FLUTICASONE PROPIONATE 2 SPRAY: 50 SPRAY, METERED NASAL at 08:13

## 2020-06-28 RX ADMIN — HYDROXYZINE HYDROCHLORIDE 10 MG: 10 TABLET, FILM COATED ORAL at 16:55

## 2020-06-28 RX ADMIN — APIXABAN 5 MG: 5 TABLET, FILM COATED ORAL at 21:54

## 2020-06-28 RX ADMIN — APIXABAN 5 MG: 5 TABLET, FILM COATED ORAL at 08:13

## 2020-06-28 RX ADMIN — IPRATROPIUM BROMIDE 0.5 MG: 0.5 SOLUTION RESPIRATORY (INHALATION) at 18:25

## 2020-06-28 RX ADMIN — SERTRALINE HYDROCHLORIDE 50 MG: 50 TABLET ORAL at 08:13

## 2020-06-28 RX ADMIN — MELATONIN 3 MG: at 21:09

## 2020-06-28 RX ADMIN — IPRATROPIUM BROMIDE 0.5 MG: 0.5 SOLUTION RESPIRATORY (INHALATION) at 07:52

## 2020-06-28 RX ADMIN — MIRTAZAPINE 7.5 MG: 7.5 TABLET ORAL at 21:09

## 2020-06-28 ASSESSMENT — PAIN SCALES - GENERAL: PAINLEVEL_OUTOF10: 0

## 2020-06-28 NOTE — PROGRESS NOTES
Progress Note  Elizabeth Kinney  6/28/2020 10:59 AM  Subjective:   Admit Date:   6/23/2020      CC/ADMIT DX:       Interval History:   Reviewed overnight events and nursing notes. He has no new physical complaints. I have reviewed all labs/diagnostics from the last 24hrs. ROS:   I have done a 10 point ROS and all are negative, except what is mentioned in the HPI. DIET LOW FAT; Medications:      guaiFENesin  1,200 mg Oral BID    fluticasone  2 spray Each Nostril Daily    traZODone  50 mg Oral Nightly    sertraline  50 mg Oral Daily    mirtazapine  7.5 mg Oral Nightly    melatonin  3 mg Oral Nightly    apixaban  5 mg Oral BID    donepezil  10 mg Oral Nightly    ipratropium  0.5 mg Nebulization 4x Daily    memantine  10 mg Oral BID    montelukast  10 mg Oral Daily    levoFLOXacin  250 mg Oral Daily           Objective:   Vitals: BP (!) 140/90   Pulse 103   Temp 97.6 °F (36.4 °C) (Temporal)   Resp 16   Ht 5' 5\" (1.651 m)   Wt 172 lb 6.4 oz (78.2 kg)   SpO2 93%   BMI 28.69 kg/m²  No intake or output data in the 24 hours ending 06/28/20 1059  General appearance: alert and cooperative with exam  Extremities: extremities normal, atraumatic, no cyanosis or edema  Neurologic:  No obvious focal neurologic deficits. Skin: no rashes    Assessment and Plan:   Depression  UTI    Plan:  1. Continue present medication(s)   2. Follow with Psych  3. Monitor BP. It has been elevated at times. Discharge planning:  home     Reviewed treatment plans with the patient and/or family.              Electronically signed by Kyra Rodriguez MD on 6/28/2020 at 10:59 AM

## 2020-06-28 NOTE — PROGRESS NOTES
Pt reports that, \"I know I told you earlier today that I wasn't having any depression or anxiety but I think after trying to talk to my sister and all this POA stuff, I feel more worked up and worried. \" Reassured pt that he would be talking to the doctor and  in the morning in regards to [facility] and discharge plans. \"I just am a little forgetful but I'm not dependent. \" Reassurance offered and notified doctor of pt's anxiety. Atarax 10 mg ordered po TID PRN. No distress noted. Will continue to monitor.

## 2020-06-28 NOTE — PROGRESS NOTES
Pt reports having slept well last night. Pt reports that his concentration is \"good\". Pt wrote on his self inventory sheet that his depression and anxiety were both \"better\" and \"improving\", when discussed with pt, pt denied any depression or anxiety. Pt denies any suicidal ideations. Pt denies hallucinations. Pt is withdrawn to his room however he is completing ADLs and is pleasant during interaction. When encouraged to come out of his room pt responded, \"I prefer self reflection and quiet. \" No distress noted. Will continue to monitor.

## 2020-06-28 NOTE — PROGRESS NOTES
WRAP UP GROUP NOTE    Patient's Goal:  Providing feedback as to their own progress in the care-plan provided. Patients have an opportunity to explore self-reflective skills and share any additional cares and concerns not yet addressed. Pt effectively participated.     Energy Level:normal  Appetite:normal  Concentration:good  Hallucinations:\"n/a\"  Depression:improved  Anxiety:improved  How I worked today:worked hard  What helps me sleep:\"Rx\"  Any questions/complaints/comments:blank    Group/activities that helped me today were:  Seeing Doctor(s)  One-to-One with Staff  Relaxation Training:\"staff\"    Electronically signed by Frandy Montalvo RN on 6/27/2020 at 8:48 PM

## 2020-06-28 NOTE — PLAN OF CARE
Problem: Altered Mood, Depressive Behavior:  Goal: Able to verbalize acceptance of life and situations over which he or she has no control  Description: Able to verbalize acceptance of life and situations over which he or she has no control  6/28/2020 1119 by Sylvester Manriquez LPC  Outcome: Ongoing  Note:                                                                     Group Therapy Note    Date: 6/28/2020  Start Time: 1115  End Time:    Number of Participants: 0    Type of Group: Psychoeducation    Wellness Binder Information  Module Name:  Emotional wellness  Session Number:  5    Patient's Goal:  Obstacles to emotional wellness    Notes:  Pt did not attend group as scheduled. Pt was invited and encouraged to attend group, however, pt reported he is tired. Nursing notified. Status After Intervention:      Participation Level:     Participation Quality:       Speech:         Thought Process/Content:       Affective Functioning:       Mood:       Level of consciousness:        Response to Learning:       Endings:     Modes of Intervention:       Discipline Responsible: /Counselor      Signature:  Sylvester Manriquez [facility]

## 2020-06-28 NOTE — PROGRESS NOTES
[facility] Daily Shift Assessment-Geriatric Unit  Nursing Progress Note          Room: [facility] [place]-01   Name: Wali Funes   Age: 66 y.o. Gender: male   Dx: <principal problem not specified>  Precautions: suicide risk and fall risk  Inpatient Status: voluntary     SLEEP:    Sleep: Yes,   Sleep Quality Fair   Hours Slept:    Sleep Medications: Yes  PRN Sleep Meds: No       MEDICAL:      Other PRN Meds: No   Med Compliant: Yes   Accu-Chek: No   Oxygen/CPAP/BiPAP: No  CIWA/CINA: No   PAIN Assessment: denies  Side Effects from medication: none voiced      PSYCH:     SI denies suicidal ideation    HI Negative for homicidal ideation        AVH:denies      Depression: pt reports improved Anxiety: pt reports improved       GENERAL:      Appetite: no change from normal  Social: No Speech: normal   Appearance:appropriately dressed  Assistive Devices: noneLevel of Assist: Independent      GROUP:    Group Participation: Yes;wrap-up group  Participation LevelMinimal    Participation QualityResistant    Notes: Pt spent most of this shift in his room, only to come out when encouraged by this nurse for VS and to fill out his wrap-up group paper. Pt confides that he is more comfortable talking in his room. Pt reports that his anxiety and depression are both improved but does not numerically rate them. Pt denies SI, HI, and AVH. Pt tells this nurse that he no longer wants his sister to be allowed privilege to his medical information or for us to obtain any information from her. Pt reports that he feels concerned that she may not have his best interests at heart. Pt reports to this nurse that he is worried that she may not give him his belongings back. Pt states \"all I want to do is get out of here and get an apartment and start trying to move forward with my life. \" Pt is not social with his peers but is bright at times in spite of isolating himself to his room. Pt laughs on occasion with this nurse at different things in conversation.  Pt is resting quietly now with his eyes closed. Monitoring for safety continues.           Electronically signed by Kendrick Gates RN on 6/27/20 at 11:21 PM CDT

## 2020-06-28 NOTE — PROGRESS NOTES
Pt asked if his sister was able to bring in [address] [employer] [place] paperwork. \"I've been thinking and reflecting on everything that's going on and you know, I don't think I want my sister to be POA. I let her be POA about a week ago, and at the time I was really depressed and thought it was a good idea but now I\"m thinking it's not. \" Reassured pt that his sister had brought in [address] [employer] [place] paperwork this afternoon and that it was in his chart. Encouraged pt that no current decisions were being made and that no decision would be made without his consent. Encouraged pt to speak with Dr. Andrew Mccoy and social work tomorrow morning to discuss options of POA and decisions related to discharge. When asked if pt had an apartment that would be ready for him to move into by July 1st (according to his sister) pt said that he did not know anything about this. Pt has asked not to receive any phone calls from his sister at this time. No distress noted. Will continue to monitor.

## 2020-06-28 NOTE — PROGRESS NOTES
Nursing staff reported pt's sister keeps calling and asking about discharge plans for pt. However, it was reported by nursing staff that pt no longer wants us to speak with his sister. When doing the collateral call, per research note, pt's sister reported she was POA and POA paperwork was requested. SW spoke with pt's sister and reported to her that we still do not have paperwork and as of right now pt no longer wants staff to speak with her. Pt's sister Demar Betancur reported her  is having heart issues and pt cannot come back there today. Sister was asking questions about pt discharging, however SW reported with no POA paperwork and pt not wanting staff to have contact with her, this writer could not answer any questions or give any information about pt.  Sister voiced understanding and reported she would bring the POA paperwork to hospital.     Electronically signed by Lissy Sherwood, [id number] on 6/28/2020 at 11:37 AM

## 2020-06-28 NOTE — PLAN OF CARE
Problem: Altered Mood, Depressive Behavior:  Goal: Able to verbalize acceptance of life and situations over which he or she has no control  Description: Able to verbalize acceptance of life and situations over which he or she has no control  6/28/2020 0817 by Chantel Freeman RN  Outcome: Ongoing  6/27/2020 2316 by Toribio Hernández RN  Outcome: Ongoing  Goal: Able to verbalize and/or display a decrease in depressive symptoms  Description: Able to verbalize and/or display a decrease in depressive symptoms  6/28/2020 0817 by Chantel Freeman RN  Outcome: Ongoing  6/27/2020 2316 by Toribio Hernández RN  Outcome: Ongoing  Goal: Ability to disclose and discuss suicidal ideas will improve  Description: Ability to disclose and discuss suicidal ideas will improve  6/28/2020 0817 by Chantel Freeman RN  Outcome: Ongoing  6/27/2020 2316 by Toribio Hernández RN  Outcome: Ongoing  Goal: Able to verbalize support systems  Description: Able to verbalize support systems  6/28/2020 0817 by Chantel Freeman RN  Outcome: Ongoing  6/27/2020 2316 by Toribio Hernández RN  Outcome: Ongoing  Goal: Absence of self-harm  Description: Absence of self-harm  6/28/2020 0817 by Chantel Freeman RN  Outcome: Ongoing  6/27/2020 2316 by Toribio Hernández RN  Outcome: Ongoing  Goal: Patient specific goal  Description: Patient specific goal  6/28/2020 0817 by Chantel Freeman RN  Outcome: Ongoing  6/27/2020 2316 by Toribio Hernández RN  Outcome: Ongoing  Goal: Participates in care planning  Description: Participates in care planning  6/28/2020 0817 by Chantel Freeman RN  Outcome: Ongoing  6/27/2020 2316 by Toribio Hernández RN  Outcome: Ongoing     Problem: Falls - Risk of:  Goal: Will remain free from falls  Description: Will remain free from falls  6/28/2020 0817 by Chantel Freeman RN  Outcome: Ongoing  6/27/2020 2316 by Toribio Hernández RN  Outcome: Ongoing  Goal: Absence of physical injury  Description: Absence of physical injury  6/28/2020 0817 by Chantel Freeman RN  Outcome: Ongoing  6/27/2020 2316 by Janis Machado RN  Outcome: Ongoing     Problem: Anxiety:  Goal: Level of anxiety will decrease  Description: Level of anxiety will decrease  6/28/2020 0817 by Lainey Gordon RN  Outcome: Ongoing  6/27/2020 2316 by Janis Machado RN  Outcome: Ongoing     Problem: Pain:  Goal: Pain level will decrease  Description: Pain level will decrease  6/28/2020 0817 by Lainey Gordon RN  Outcome: Ongoing  6/27/2020 2316 by Janis Machado RN  Outcome: Ongoing  Goal: Control of acute pain  Description: Control of acute pain  6/28/2020 0817 by Lainey Gordon RN  Outcome: Ongoing  6/27/2020 2316 by Janis Machado RN  Outcome: Ongoing  Goal: Control of chronic pain  Description: Control of chronic pain  6/28/2020 0817 by Lainey Gordon RN  Outcome: Ongoing  6/27/2020 2316 by Janis Machado RN  Outcome: Ongoing     Problem: Health Behavior:  Goal: Ability to verbalize adaptive coping strategies to use when suicidal feelings occur will improve  Description: Ability to verbalize adaptive coping strategies to use when suicidal feelings occur will improve  6/28/2020 0817 by Lainey Gordon RN  Outcome: Ongoing  6/27/2020 2316 by Janis Machado RN  Outcome: Ongoing  Goal: Ability to verbalize adaptive coping strategies to use when the urge to self-mutilate occurs will improve  Description: Ability to verbalize adaptive coping strategies to use when the urge to self-mutilate occurs will improve  6/28/2020 0817 by Lainey Gordon RN  Outcome: Ongoing  6/27/2020 2316 by Janis Machado RN  Outcome: Ongoing     Problem: Safety:  Goal: Ability to contract for his/her safety will improve  Description: Ability to contract for his/her safety will improve  6/28/2020 0817 by Lainey Gordon RN  Outcome: Ongoing  6/27/2020 2316 by Janis Machado RN  Outcome: Ongoing

## 2020-06-29 VITALS
BODY MASS INDEX: 28.72 KG/M2 | HEART RATE: 105 BPM | RESPIRATION RATE: 18 BRPM | OXYGEN SATURATION: 96 % | TEMPERATURE: 98.4 F | SYSTOLIC BLOOD PRESSURE: 116 MMHG | HEIGHT: 65 IN | DIASTOLIC BLOOD PRESSURE: 74 MMHG | WEIGHT: 172.4 LBS

## 2020-06-29 PROBLEM — F03.90 DEMENTIA WITHOUT BEHAVIORAL DISTURBANCE (HCC): Chronic | Status: RESOLVED | Noted: 2020-06-29 | Resolved: 2020-06-29

## 2020-06-29 PROBLEM — R41.89 COGNITIVE IMPAIRMENT: Chronic | Status: ACTIVE | Noted: 2020-06-29

## 2020-06-29 PROBLEM — F03.90 DEMENTIA WITHOUT BEHAVIORAL DISTURBANCE (HCC): Chronic | Status: ACTIVE | Noted: 2020-06-29

## 2020-06-29 LAB
ANION GAP SERPL CALCULATED.3IONS-SCNC: 12 MMOL/L (ref 7–19)
BUN BLDV-MCNC: 21 MG/DL (ref 8–23)
CALCIUM SERPL-MCNC: 8.6 MG/DL (ref 8.8–10.2)
CHLORIDE BLD-SCNC: 106 MMOL/L (ref 98–111)
CO2: 26 MMOL/L (ref 22–29)
CREAT SERPL-MCNC: 1.5 MG/DL (ref 0.5–1.2)
GFR NON-AFRICAN AMERICAN: 45
GLUCOSE BLD-MCNC: 108 MG/DL (ref 74–109)
POTASSIUM SERPL-SCNC: 3.7 MMOL/L (ref 3.5–5)
SODIUM BLD-SCNC: 144 MMOL/L (ref 136–145)

## 2020-06-29 PROCEDURE — 94640 AIRWAY INHALATION TREATMENT: CPT

## 2020-06-29 PROCEDURE — 36415 COLL VENOUS BLD VENIPUNCTURE: CPT

## 2020-06-29 PROCEDURE — 6360000002 HC RX W HCPCS: Performed by: PSYCHIATRY & NEUROLOGY

## 2020-06-29 PROCEDURE — 6370000000 HC RX 637 (ALT 250 FOR IP): Performed by: PSYCHIATRY & NEUROLOGY

## 2020-06-29 PROCEDURE — 6370000000 HC RX 637 (ALT 250 FOR IP): Performed by: FAMILY MEDICINE

## 2020-06-29 PROCEDURE — 99239 HOSP IP/OBS DSCHRG MGMT >30: CPT | Performed by: PSYCHIATRY & NEUROLOGY

## 2020-06-29 PROCEDURE — 5130000000 HC BRIDGE APPOINTMENT

## 2020-06-29 PROCEDURE — 80048 BASIC METABOLIC PNL TOTAL CA: CPT

## 2020-06-29 RX ORDER — DIAPER,BRIEF,INFANT-TODD,DISP
EACH MISCELLANEOUS
Qty: 30 G | Refills: 0 | Status: SHIPPED | OUTPATIENT
Start: 2020-06-29

## 2020-06-29 RX ORDER — LANOLIN ALCOHOL/MO/W.PET/CERES
3 CREAM (GRAM) TOPICAL NIGHTLY
Qty: 30 TABLET | Refills: 1 | Status: SHIPPED | OUTPATIENT
Start: 2020-06-29 | End: 2020-07-29

## 2020-06-29 RX ORDER — MEMANTINE HYDROCHLORIDE 10 MG/1
10 TABLET ORAL 2 TIMES DAILY
Qty: 60 TABLET | Refills: 1 | Status: SHIPPED | OUTPATIENT
Start: 2020-06-29 | End: 2020-07-29

## 2020-06-29 RX ORDER — LEVOFLOXACIN 250 MG/1
250 TABLET ORAL DAILY
Qty: 5 TABLET | Refills: 0 | Status: SHIPPED | OUTPATIENT
Start: 2020-06-29 | End: 2020-07-04

## 2020-06-29 RX ORDER — TRAZODONE HYDROCHLORIDE 50 MG/1
75 TABLET ORAL NIGHTLY
Qty: 45 TABLET | Refills: 1 | Status: SHIPPED | OUTPATIENT
Start: 2020-06-29 | End: 2020-07-29

## 2020-06-29 RX ORDER — DONEPEZIL HYDROCHLORIDE 10 MG/1
10 TABLET, FILM COATED ORAL NIGHTLY
Qty: 30 TABLET | Refills: 1 | Status: SHIPPED | OUTPATIENT
Start: 2020-06-29 | End: 2020-07-29

## 2020-06-29 RX ORDER — MIRTAZAPINE 7.5 MG/1
7.5 TABLET, FILM COATED ORAL NIGHTLY
Qty: 30 TABLET | Refills: 1 | Status: SHIPPED | OUTPATIENT
Start: 2020-06-29 | End: 2020-07-29

## 2020-06-29 RX ORDER — GUAIFENESIN 600 MG/1
1200 TABLET, EXTENDED RELEASE ORAL 2 TIMES DAILY
Qty: 120 TABLET | Refills: 0 | Status: SHIPPED | OUTPATIENT
Start: 2020-06-29 | End: 2020-07-29

## 2020-06-29 RX ORDER — FLUTICASONE PROPIONATE 50 MCG
2 SPRAY, SUSPENSION (ML) NASAL DAILY
Qty: 1 BOTTLE | Refills: 1 | Status: SHIPPED | OUTPATIENT
Start: 2020-06-30

## 2020-06-29 RX ORDER — MONTELUKAST SODIUM 10 MG/1
10 TABLET ORAL DAILY
Qty: 30 TABLET | Refills: 1 | Status: SHIPPED | OUTPATIENT
Start: 2020-06-30 | End: 2020-07-30

## 2020-06-29 RX ADMIN — SERTRALINE HYDROCHLORIDE 25 MG: 50 TABLET ORAL at 08:49

## 2020-06-29 RX ADMIN — HYDROCORTISONE: 1 OINTMENT TOPICAL at 08:46

## 2020-06-29 RX ADMIN — MEMANTINE HYDROCHLORIDE 10 MG: 5 TABLET ORAL at 08:45

## 2020-06-29 RX ADMIN — IPRATROPIUM BROMIDE 0.5 MG: 0.5 SOLUTION RESPIRATORY (INHALATION) at 10:40

## 2020-06-29 RX ADMIN — MONTELUKAST SODIUM 10 MG: 10 TABLET, COATED ORAL at 08:46

## 2020-06-29 RX ADMIN — IPRATROPIUM BROMIDE 0.5 MG: 0.5 SOLUTION RESPIRATORY (INHALATION) at 06:42

## 2020-06-29 RX ADMIN — APIXABAN 5 MG: 5 TABLET, FILM COATED ORAL at 08:46

## 2020-06-29 RX ADMIN — FLUTICASONE PROPIONATE 2 SPRAY: 50 SPRAY, METERED NASAL at 08:46

## 2020-06-29 RX ADMIN — GUAIFENESIN 1200 MG: 600 TABLET, EXTENDED RELEASE ORAL at 08:45

## 2020-06-29 NOTE — PROGRESS NOTES
SW met with treatment team to discuss pt's progress and setbacks. SW 2 was present. Pt reportedly slept well last night, with medications, appetite is good, requires encouragement to attend scheduled group activities, minimal participation, minimally social with peers/staff, performs ADL's, compliant with medications, behavior has been cooperative/jovial, reports his depression has improved, intermittent anxiety, denies SI/HI/AVH, will be discharged today, follow-up appointments will be scheduled.

## 2020-06-29 NOTE — PROGRESS NOTES
Discharge Note    Pt discharging on this date. Pt denies SI, HI, and AVH at this time. Pt reports improvement in behavior and is leaving unit in overall good condition. SW and pt discussed pt's follow up appointments and importance of attending appointments as scheduled, pt voiced understanding and agreement. Pt able to verbally identify: warning signs, coping strategies, places and people that help make the pt feel better/distract negative thoughts, friends/family/agencies/professionals the pt can reach out to in a crisis, and something that is important to the pt/worth living for. Pt provided the national suicide prevention hotline number (2-990-194-877-999-0950) as well as local community behavioral health [facility]) crisis number for emergencies (4-256-114-364-384-7898). Pt to follow up with [address] [place] [address] Drive,[facility] for a therapy appointment on 6/30/2020 at 11 AM with Eual Soulier and a med management appointment on 7/8/2020 at 8:30 AM with Dr. Latisha Dempsey. SW offered to assist pt with transportation, pt reports he has transportation. Referral to out patient tobacco cessation counseling treatment: Patient refused tobacco cessation counseling. Per research note, SW spoke with pt's sister Sofia Larson about weapons in home. Sister reported guns in home. The importance of putting weapons in a secured location or removing weapons from home was discussed. Sister voiced understanding and reported she has put the weapons where pt cannot find them. Pt denies use of substances. Referral refused/declined.      Electronically signed by Bijan Cat [facility] on 6/29/2020 at 9:52 AM

## 2020-06-29 NOTE — PROGRESS NOTES
Spoke with pt on this date about contacting his sister about discharge. Pt voiced agreement and reported \"of course you can talk to her, I am going to her house\".      Electronically signed by Kasie Maurice, [id number] on 6/29/2020 at 8:57 AM

## 2020-06-29 NOTE — PLAN OF CARE
Group Therapy Note    Date: 6/29/2020  Start Time: 1000  End Time:  1130  Number of Participants: 4    Type of Group: Psychoeducation    Wellness Binder Information  Module Name:  Relapse Prevention  Session Number:  5    Group Goal for Pt: To improve knowledge of strategies to prevent relapse    Notes:  Pt did not attend group discussion. Pt was invited/encouraged. Status After Intervention:      Participation Level:     Participation Quality:       Speech:         Thought Process/Content:       Affective Functioning:       Mood:       Level of consciousness:        Response to Learning:       Endings:     Modes of Intervention:       Discipline Responsible:       Signature:  Dominick Craig

## 2020-06-29 NOTE — PROGRESS NOTES
BHI Daily Shift Assessment  Nursing Progress Note    Room: 0618/618-01 Name: Channing Antonio Age: 66 y.o. Gender: male   Dx: <principal problem not specified>  Precautions: suicide risk and fall risk  Target Symptoms:   Accu-Chek: NoSleep: Yes,Sleep Quality Good SI No AVH denies [address] [place] [address]  ADLs: No Speech: normal Depression: \"improving\" Anxiety: \"improving\"   Participation Level  Appetite: Good  Visitation:    Participation Quality    Complaints:    Notes: Pt up lying in bed. Pt calm and cooperative. Pt is bright and smiling this morning. Pt denies SI, HI, and AVH. Pt reports his depression and anxiety are \"improving. \" Pt reports good sleep and normal appetite. Pt is social at times but isolates to his room most of the day. Pt goes to group. Pt did not do ADLs.     Signature:   Electronically signed by Jossue García RN on 6/29/2020 at 9:30 AM

## 2020-06-29 NOTE — PROGRESS NOTES
After pt giving consent to speak with his sister Soraya Fonseca, this writer called her to let her know that pt discharging today. Pt's sister reported she thinks pt is psychotic and a danger to self. Sister reported pt makes everyone think that he is normal and he is not.  notified. Sister reported she will talk to him about going to a motel tonight. Sister reported she will pick him up at 4 PM today.      Electronically signed by Onesimo Hummel, [id number] on 6/29/2020 at 9:48 AM

## 2020-06-29 NOTE — PROGRESS NOTES
BHI Daily Shift Assessment-Geriatric Unit  Nursing Progress Note          Room: [facility]618-01   Name: Randa Henderson   Age: 66 y.o. Gender: male   Dx: <principal problem not specified>  Precautions: suicide risk and fall risk  Inpatient Status: voluntary     SLEEP:    Sleep: Yes,   Sleep Quality Good   Hours Slept:    Sleep Medications: Yes  PRN Sleep Meds: No       MEDICAL:      Other PRN Meds: No   Med Compliant: Yes   Accu-Chek: No   Oxygen/CPAP/BiPAP: No  CIWA/CINA: No   PAIN Assessment: denies  Side Effects from medication: denies      PSYCH:     SI denies suicidal ideation    HI Negative for homicidal ideation        AVH:denies      Depression: improved Anxiety: on/off      GENERAL:      Appetite: no change from normal  Social: No Speech: normal   Appearance:appropriately dressed  Assistive Devices: noneLevel of Assist: Independent      GROUP:    Group Participation: Yes  Participation LevelMinimal    Participation QualityResistant    Notes: Pt cooperative and jovial tonight with his interview. Pt reports that his anxiety is on/off mostly r/t his sister and the \"things going on with her. \" Pt says his depression is improved. Pt continues to deny SI, HI, and AVH. Pt has had difficulties staying asleep tonight mostly due to continued coughing. When checking on him, pt continues to state that he is okay. No s/s of distress noted; no labored breathing. Pt is resting now. Monitoring for safety continues.      Electronically signed by Misael Carter RN on 6/29/2020 at 1:40 AM          Electronically signed by Misael Carter RN on 6/29/20 at 1:23 AM CDT

## 2020-06-29 NOTE — PROGRESS NOTES
Larry Cervantes from [address] called and reported pt's social security number per Medicaid is not what is listed in the chart. Larry Stable reported per Medicaid it is .      Electronically signed by Leopold Sequin, [id number] on 6/29/2020 at 10:55 AM

## 2020-06-29 NOTE — PLAN OF CARE
Problem: Altered Mood, Depressive Behavior:  Goal: Able to verbalize acceptance of life and situations over which he or she has no control  Description: Able to verbalize acceptance of life and situations over which he or she has no control  6/29/2020 0108 by Frandy Montalvo RN  Outcome: Ongoing  6/28/2020 1119 by Leora Reyes LPC  Outcome: Ongoing  Note:                                                                     Group Therapy Note    Date: 6/28/2020  Start Time: 1115  End Time:    Number of Participants: 0    Type of Group: Psychoeducation    Wellness Binder Information  Module Name:  Emotional wellness  Session Number:  5    Patient's Goal:  Obstacles to emotional wellness    Notes:  Pt did not attend group as scheduled. Pt was invited and encouraged to attend group, however, pt reported he is tired. Nursing notified. Status After Intervention:      Participation Level:     Participation Quality:       Speech:         Thought Process/Content:       Affective Functioning:       Mood:       Level of consciousness:        Response to Learning:       Endings:     Modes of Intervention:       Discipline Responsible: /Counselor      Signature:  Leora Reyes LPC    Goal: Able to verbalize and/or display a decrease in depressive symptoms  Description: Able to verbalize and/or display a decrease in depressive symptoms  Outcome: Ongoing  Goal: Ability to disclose and discuss suicidal ideas will improve  Description: Ability to disclose and discuss suicidal ideas will improve  Outcome: Ongoing  Goal: Able to verbalize support systems  Description: Able to verbalize support systems  Outcome: Ongoing  Goal: Absence of self-harm  Description: Absence of self-harm  Outcome: Ongoing  Goal: Patient specific goal  Description: Patient specific goal  Outcome: Ongoing  Goal: Participates in care planning  Description: Participates in care planning  Outcome: Ongoing     Problem: Falls - Risk of:  Goal: Will remain free from falls  Description: Will remain free from falls  Outcome: Ongoing  Goal: Absence of physical injury  Description: Absence of physical injury  Outcome: Ongoing     Problem: Anxiety:  Goal: Level of anxiety will decrease  Description: Level of anxiety will decrease  Outcome: Ongoing     Problem: Pain:  Goal: Pain level will decrease  Description: Pain level will decrease  Outcome: Ongoing  Goal: Control of acute pain  Description: Control of acute pain  Outcome: Ongoing  Goal: Control of chronic pain  Description: Control of chronic pain  Outcome: Ongoing     Problem: Health Behavior:  Goal: Ability to verbalize adaptive coping strategies to use when suicidal feelings occur will improve  Description: Ability to verbalize adaptive coping strategies to use when suicidal feelings occur will improve  Outcome: Ongoing  Goal: Ability to verbalize adaptive coping strategies to use when the urge to self-mutilate occurs will improve  Description: Ability to verbalize adaptive coping strategies to use when the urge to self-mutilate occurs will improve  Outcome: Ongoing     Problem: Safety:  Goal: Ability to contract for his/her safety will improve  Description: Ability to contract for his/her safety will improve  Outcome: Ongoing

## 2020-06-29 NOTE — PLAN OF CARE
Problem: Altered Mood, Depressive Behavior:  Goal: Able to verbalize acceptance of life and situations over which he or she has no control  Description: Able to verbalize acceptance of life and situations over which he or she has no control  6/29/2020 0751 by Ravindra Mcfarlane RN  Outcome: Ongoing  6/29/2020 0108 by Bethany Falcon RN  Outcome: Ongoing  Goal: Able to verbalize and/or display a decrease in depressive symptoms  Description: Able to verbalize and/or display a decrease in depressive symptoms  6/29/2020 0751 by Ravindra Mcfarlane RN  Outcome: Ongoing  6/29/2020 0108 by Bethany Falcon RN  Outcome: Ongoing  Goal: Ability to disclose and discuss suicidal ideas will improve  Description: Ability to disclose and discuss suicidal ideas will improve  6/29/2020 0751 by Ravindra Mcfarlane RN  Outcome: Ongoing  6/29/2020 0108 by Bethany Falcon RN  Outcome: Ongoing  Goal: Able to verbalize support systems  Description: Able to verbalize support systems  6/29/2020 0751 by Ravindra Mcfarlane RN  Outcome: Ongoing  6/29/2020 0108 by Bethany Falcon RN  Outcome: Ongoing  Goal: Absence of self-harm  Description: Absence of self-harm  6/29/2020 0751 by Ravindra Mcfarlane RN  Outcome: Ongoing  6/29/2020 0108 by Bethany Falcon RN  Outcome: Ongoing  Goal: Patient specific goal  Description: Patient specific goal  6/29/2020 0751 by Ravindra Mcfarlane RN  Outcome: Ongoing  6/29/2020 0108 by Bethany Falcon RN  Outcome: Ongoing  Goal: Participates in care planning  Description: Participates in care planning  6/29/2020 0751 by Ravindra Mcfarlane RN  Outcome: Ongoing  6/29/2020 0108 by Bethany Falcon RN  Outcome: Ongoing     Problem: Falls - Risk of:  Goal: Will remain free from falls  Description: Will remain free from falls  6/29/2020 0751 by Ravindra Mcfarlane RN  Outcome: Ongoing  6/29/2020 0108 by Bethany Falcon RN  Outcome: Ongoing  Goal: Absence of physical injury  Description: Absence of physical injury  6/29/2020 [address] by Jaja Fuentes AWA Sandoval  Outcome: Ongoing  6/29/2020 0108 by Ardith Bernheim, RN  Outcome: Ongoing     Problem: Anxiety:  Goal: Level of anxiety will decrease  Description: Level of anxiety will decrease  6/29/2020 0751 by Jamie Gorman RN  Outcome: Ongoing  6/29/2020 0108 by Ardith Bernheim, RN  Outcome: Ongoing     Problem: Pain:  Description: Pain management should include both nonpharmacologic and pharmacologic interventions.   Goal: Pain level will decrease  Description: Pain level will decrease  6/29/2020 0751 by Jamie Gorman RN  Outcome: Ongoing  6/29/2020 0108 by Ardith Bernheim, RN  Outcome: Ongoing  Goal: Control of acute pain  Description: Control of acute pain  6/29/2020 0751 by Jamie Gorman RN  Outcome: Ongoing  6/29/2020 0108 by Ardith Bernheim, RN  Outcome: Ongoing  Goal: Control of chronic pain  Description: Control of chronic pain  6/29/2020 0751 by Jamie Gorman RN  Outcome: Ongoing  6/29/2020 0108 by Ardith Bernheim, RN  Outcome: Ongoing     Problem: Health Behavior:  Goal: Ability to verbalize adaptive coping strategies to use when suicidal feelings occur will improve  Description: Ability to verbalize adaptive coping strategies to use when suicidal feelings occur will improve  6/29/2020 0751 by Jamie Gorman RN  Outcome: Ongoing  6/29/2020 0108 by Ardith Bernheim, RN  Outcome: Ongoing  Goal: Ability to verbalize adaptive coping strategies to use when the urge to self-mutilate occurs will improve  Description: Ability to verbalize adaptive coping strategies to use when the urge to self-mutilate occurs will improve  6/29/2020 0751 by Jamie Gorman RN  Outcome: Ongoing  6/29/2020 0108 by Ardith Bernheim, RN  Outcome: Ongoing     Problem: Safety:  Goal: Ability to contract for his/her safety will improve  Description: Ability to contract for his/her safety will improve  6/29/2020 0751 by Jamie Gorman RN  Outcome: Ongoing  6/29/2020 0108 by Ardith Bernheim, RN  Outcome: Ongoing

## 2020-06-29 NOTE — PROGRESS NOTES
Group Therapy Note    Date: 6/29/2020  Start Time: 0800  End Time:  0830  Number of Participants: 5    Type of Group: Community Meeting    Patient's Goal:  \"discharge\"    Status After Intervention:  Improved    Participation Level: Active Listener and Interactive    Participation Quality: Appropriate and Attentive    Speech:  normal    Thought Process/Content: Logical  Linear    Affective Functioning: Congruent    Mood: anxious and depressed    Level of consciousness:  Alert, Oriented x4 and Attentive    Response to Learning: Progressing to goal    Endings: None Reported    Modes of Intervention: Exploration    Discipline Responsible: Registered Nurse      Signature:   Lola Javier RN

## 2020-06-29 NOTE — DISCHARGE SUMMARY
Discharge Summary     Patient ID:  Wali Funes  [id number]  20 y.o.  1945    Admit date: 6/23/2020  Discharge date: 6/29/2020    Admitting Physician: Katie Edwards MD   Attending Physician: Remberto Gardner MD   Discharge Provider: Remberto Gardner MD     Admission Diagnoses:   Major depressive disorder, recurrent, severe, without psychotic features  Dementia  Alcohol use  History of bladder cancer  Urostomy  OLEG  UTI  Hypertriglyceridemia    Discharge Diagnoses:   Major depressive disorder, recurrent, severe, without psychotic features  Cognitive impairment  Insomnia  Alcohol use  History of bladder cancer  Urostomy  Renal insufficiency  Hypertriglyceridemia    Admission Condition: poor    Discharged Condition: stable    Indication for Admission: Hopelessness, impaired function    CHIEF COMPLAINT: \"I feel depressed\"     History obtained from: patient, chart     HISTORY OF PRESENT ILLNESS:    64-year-old white male with history of depression and dementia, bladder cancer (has urostomy), new to our service, admitted for worsening depression, poor function and hopelessness. Patient stated he got  a few years ago after 29 years of marriage, however, he  from his wife only about 2 months ago. UDS negative, BAL less than 10. Came with OLEG and UTI.       Patient is observed resting in bed this morning. He is hard of hearing, and interview was, therefore, somewhat limited. He is calm and cooperative. Smiling at times. Oriented to person, place, date and situation. States he is feeling better today. Had a good rest last night. Denies suicidal ideation at this time. States he has been feeling depressed in the setting of his social stressors. He has been having a hard time adjusting to his life after separation with his wife. He has no children of his own, and has been feeling lonely. \"I feel that I need to find a new purpose in life. \"  He reports low mood, anhedonia, poor sleep and appetite. Voices hopelessness and helplessness. He denies auditory and visual hallucinations. Denies mood swings and racing thoughts. Denies paranoia. He is open to medication adjustments. States his sister is his main support. Gave us permission to contact her. States she can bring his hearing aids and dentures to the unit. Patient currently denies physical symptoms.     PSYCHIATRIC HISTORY:    Diagnoses: Depression  Suicide attempts/gestures: Denies   Prior hospitalizations: [facility] over 20 years ago  Medication trials: Denies   Mental health contact: Lost to follow-up   Head trauma: Denies     SUBSTANCE USE HISTORY:  Drinks socially. Denies illicit drug use. Denies tobacco use. Hospital Course:  Patient was admitted to the behavioral health floor and was acclimated to the unit. He was placed on suicide precautions. Labs were reviewed and physical exam was completed by Dr. Ebony Manzo and associates. Home medications were reconciled. EVAN was obtained and reviewed. Patient was tapered off Zoloft. He was started on Remeron for depression, insomnia and poor appetite. He received trazodone for sleep. He was continued on his home regimen for chronic medical issues. He received antibiotic for UTI. Patient tolerated all his medications well, without side effects, and was compliant. Patient came with elevated creatinine which remained elevated throughout the admission. He was also found to have abnormal lipid panel. Outpatient follow-up with the primary care provider was recommended. Patient was able to perform his own urostomy care while on the unit. Patient initially presented with dysphoric affect and significant psychomotor retardation. With treatment, his mood and affect improved. There was no evidence of suicidality or psychosis. Patient was oriented to person, place, date and situation at all times. He did not participate in groups due to communication barrier.   He did not have his hearing aids while on the unit. We were unable to perform cognitive testing due to that reason. Patient received individual psychotherapy. All interactions with staff members were appropriate. Behaviorally, he was not a problem. Sleep and appetite improved. Patient performed ADLs and was functioning well. With the above-mentioned medications changes as well as psychotherapeutic interventions, patient reported considerable improvement in his condition and requested discharge. He was future-oriented and talked about his plan to eventually move to [facility] where he has family and friends. It was felt that patient is at his baseline. Collateral information was obtained from patient's sister, his POA, who agreed with discharge. She arranged accommodation for patient. Patient has no access to guns at home. On 6/29/2020 it was therefore decided to discharge the patient, as it was felt that he received maximal benefit from his hospitalization. This patient is not suicidal, homicidal or psychotic at discharge. He does not present danger to self or others.       Number of antipsychotic medication prescribed at discharge:  0  IF MORE THAN ONE IS USED: NA    History of greater than 3 failed multiple monotherapy trials: NA  Monotherapy taper plan/ cross taper in progress: NA  Augmentation of Clozapine: NA    Referral to addiction treatment: NA    Prescription for Alcohol or Drug Disorder Medication: NA    Prescription for Tobacco Cessation medication: none    If no prescriptions for Tobacco Cessation must document why: Non-smoker    Consults: Internal medicine    Significant Diagnostic Studies:   Lab Results   Component Value Date    WBC 7.4 06/23/2020    HGB 14.9 06/23/2020    HCT 42.2 06/23/2020    .2 (H) 06/23/2020     06/23/2020     Lab Results   Component Value Date     06/29/2020    K 3.7 06/29/2020     06/29/2020    CO2 26 06/29/2020    BUN 21 06/29/2020    CREATININE 1.5 (H) 06/29/2020    GLUCOSE 108 06/29/2020    CALCIUM 8.6 (L) 06/29/2020    PROT 7.1 06/23/2020    LABALBU 3.9 06/23/2020    BILITOT 0.3 06/23/2020    ALKPHOS 114 06/23/2020    AST 22 06/23/2020    ALT 18 06/23/2020    LABGLOM 45 (A) 06/29/2020         Lab Results   Component Value Date    PVMTNQDB58 322 06/25/2020     Lab Results   Component Value Date    VITD25 48.3 06/25/2020     Lab Results   Component Value Date    CHOL 158 (L) 06/24/2020     Lab Results   Component Value Date    TRIG 197 (H) 06/24/2020     Lab Results   Component Value Date    HDL 34 (L) 06/24/2020     Lab Results   Component Value Date    LDLCALC 85 06/24/2020     No results found for: LABVLDL, VLDL  No results found for: CHOLHDLRATIO  Lab Results   Component Value Date    LABA1C 5.1 06/24/2020     No results found for: EAG  Lab Results   Component Value Date    TSHFT4 1.78 06/25/2020       Treatments: RN and SW    Mental status examination at the time of discharge:  Alert, Oriented X 4  Appearance:  Improved Hygiene  Speech with Regular Rate and Rhythm  Eye Contact:  Good  Mild Psychomotor Retardation Noted  Attitude:  Cooperative  Mood:  \"Good\"  Affective: Congruent, appropriate to the situation, with a normal range and intensity  Thought Processes:  Coherently communicated, logical and goal oriented  Thought Content:  No Suicidal Ideation, No Homicidal Ideation, No Auditory or Visual Hallucinations, NO Overt Delusions  Insight: Improved  Judgement: Improved  Memory is intact for both remote and recent  Intellectual Functioning:  Within the [address] of Knowledge:  Adequate  Attention and Concentration:  Adequate    Discharge Exam:  Please, see medical note    Disposition: home    Patient Instructions:   Current Discharge Medication List      START taking these medications    Details   mirtazapine (REMERON) 7.5 MG tablet Take 1 tablet by mouth nightly  Qty: 30 tablet, Refills: 1      melatonin 3 MG TABS tablet Take 1 tablet by mouth nightly  Qty: 30 tablet, Refills: 1      levoFLOXacin (LEVAQUIN) 250 MG tablet Take 1 tablet by mouth daily for 5 days  Qty: 5 tablet, Refills: 0      guaiFENesin (MUCINEX) 600 MG extended release tablet Take 2 tablets by mouth 2 times daily  Qty: 120 tablet, Refills: 0      fluticasone (FLONASE) 50 MCG/ACT nasal spray 2 sprays by Each Nostril route daily  Qty: 1 Bottle, Refills: 1      hydrocortisone 1 % ointment Apply topically 2 times daily. Qty: 30 g, Refills: 0         CONTINUE these medications which have CHANGED    Details   traZODone (DESYREL) 50 MG tablet Take 1.5 tablets by mouth nightly  Qty: 45 tablet, Refills: 1      donepezil (ARICEPT) 10 MG tablet Take 1 tablet by mouth nightly  Qty: 30 tablet, Refills: 1      memantine (NAMENDA) 10 MG tablet Take 1 tablet by mouth 2 times daily  Qty: 60 tablet, Refills: 1      apixaban (ELIQUIS) 5 MG TABS tablet Take 1 tablet by mouth 2 times daily  Qty: 60 tablet, Refills: 1      montelukast (SINGULAIR) 10 MG tablet Take 1 tablet by mouth daily  Qty: 30 tablet, Refills: 1         CONTINUE these medications which have NOT CHANGED    Details   ipratropium (ATROVENT) 0.02 % nebulizer solution Take 0.5 mg by nebulization 4 times daily         STOP taking these medications       sertraline (ZOLOFT) 50 MG tablet Comments:   Reason for Stopping:         sertraline (ZOLOFT) 25 MG tablet Comments:   Reason for Stopping:         Cetirizine HCl (ZYRTEC ALLERGY PO) Comments:   Reason for Stopping:         METOPROLOL TARTRATE PO Comments:   Reason for Stopping:         cefdinir (OMNICEF) 300 MG capsule Comments:   Reason for Stopping:               Activity: as tolerated  Diet: low fat, low cholesterol diet  Wound Care: none needed    Follow-up:  Jun30 Go to [address] [place] [address] Drive,[facility]   Tuesday Jun 30, 2020   Therapy appointment at 11 AM with Lidya Mares. Appointment will be over phone with providerGale Decker for Adult Services

## 2020-06-30 NOTE — PROGRESS NOTES
Progress Note  Eddie Benitez  6/29/2020 10:34 PM  Subjective:   Admit Date:   6/23/2020      CC/ADMIT DX:       Interval History:   Reviewed overnight events and nursing notes. He denies any new medical issues. I have reviewed all labs/diagnostics from the last 24hrs. ROS:   I have done a 10 point ROS and all are negative, except what is mentioned in the HPI. No diet orders on file    Medications:             Objective:   Vitals: /74   Pulse 105   Temp 98.4 °F (36.9 °C) (Temporal)   Resp 18   Ht 5' 5\" (1.651 m)   Wt 172 lb 6.4 oz (78.2 kg)   SpO2 96%   BMI 28.69 kg/m²  No intake or output data in the 24 hours ending 06/29/20 2234  General appearance: alert and cooperative with exam  Extremities: extremities normal, atraumatic, no cyanosis or edema  Neurologic:  No obvious focal neurologic deficits. Skin: no rashes    Assessment and Plan:   Depression  UTI    Plan:  1. Continue present medication(s)   2. Follow with Psych  3. He is medically stable. I will monitor for any changes or concerns. Discharge planning:  home     Reviewed treatment plans with the patient and/or family.              Electronically signed by Kane Wilson MD on 6/29/2020 at 10:34 PM

## 2020-09-15 ENCOUNTER — APPOINTMENT (OUTPATIENT)
Dept: GENERAL RADIOLOGY | Facility: HOSPITAL | Age: 75
End: 2020-09-15

## 2020-09-15 ENCOUNTER — HOSPITAL ENCOUNTER (INPATIENT)
Facility: HOSPITAL | Age: 75
LOS: 1 days | Discharge: HOME | End: 2020-09-16
Attending: PSYCHIATRY & NEUROLOGY | Admitting: PSYCHIATRY & NEUROLOGY

## 2020-09-15 PROBLEM — Z85.51 HISTORY OF BLADDER CANCER: Status: ACTIVE | Noted: 2020-09-15

## 2020-09-15 PROBLEM — F33.2 SEVERE EPISODE OF RECURRENT MAJOR DEPRESSIVE DISORDER, WITHOUT PSYCHOTIC FEATURES (HCC): Status: ACTIVE | Noted: 2020-09-15

## 2020-09-15 PROBLEM — F32.9 MAJOR DEPRESSION: Status: ACTIVE | Noted: 2020-09-15

## 2020-09-15 PROBLEM — L40.9 PSORIASIS: Status: ACTIVE | Noted: 2020-09-15

## 2020-09-15 LAB
T4 FREE SERPL-MCNC: 1.44 NG/DL (ref 0.93–1.7)
TSH SERPL DL<=0.05 MIU/L-ACNC: 0.92 UIU/ML (ref 0.27–4.2)

## 2020-09-15 PROCEDURE — 84439 ASSAY OF FREE THYROXINE: CPT | Performed by: PSYCHIATRY & NEUROLOGY

## 2020-09-15 PROCEDURE — 71046 X-RAY EXAM CHEST 2 VIEWS: CPT | Performed by: RADIOLOGY

## 2020-09-15 PROCEDURE — 71046 X-RAY EXAM CHEST 2 VIEWS: CPT

## 2020-09-15 PROCEDURE — 84443 ASSAY THYROID STIM HORMONE: CPT | Performed by: PSYCHIATRY & NEUROLOGY

## 2020-09-15 PROCEDURE — 99223 1ST HOSP IP/OBS HIGH 75: CPT | Performed by: PSYCHIATRY & NEUROLOGY

## 2020-09-15 RX ORDER — TRAZODONE HYDROCHLORIDE 150 MG/1
150 TABLET ORAL NIGHTLY
COMMUNITY

## 2020-09-15 RX ORDER — DULOXETIN HYDROCHLORIDE 30 MG/1
60 CAPSULE, DELAYED RELEASE ORAL DAILY
Status: DISCONTINUED | OUTPATIENT
Start: 2020-09-15 | End: 2020-09-16 | Stop reason: HOSPADM

## 2020-09-15 RX ORDER — ECHINACEA PURPUREA EXTRACT 125 MG
2 TABLET ORAL AS NEEDED
Status: DISCONTINUED | OUTPATIENT
Start: 2020-09-15 | End: 2020-09-16 | Stop reason: HOSPADM

## 2020-09-15 RX ORDER — ACETAMINOPHEN 325 MG/1
650 TABLET ORAL EVERY 6 HOURS PRN
Status: DISCONTINUED | OUTPATIENT
Start: 2020-09-15 | End: 2020-09-16 | Stop reason: HOSPADM

## 2020-09-15 RX ORDER — DULOXETIN HYDROCHLORIDE 30 MG/1
60 CAPSULE, DELAYED RELEASE ORAL DAILY
Status: CANCELLED | OUTPATIENT
Start: 2020-09-15

## 2020-09-15 RX ORDER — BUSPIRONE HYDROCHLORIDE 10 MG/1
10 TABLET ORAL 3 TIMES DAILY
Status: ON HOLD | COMMUNITY
End: 2020-09-16 | Stop reason: SDUPTHER

## 2020-09-15 RX ORDER — LANOLIN ALCOHOL/MO/W.PET/CERES
3 CREAM (GRAM) TOPICAL NIGHTLY PRN
Status: DISCONTINUED | OUTPATIENT
Start: 2020-09-17 | End: 2020-09-16 | Stop reason: HOSPADM

## 2020-09-15 RX ORDER — ALUMINA, MAGNESIA, AND SIMETHICONE 2400; 2400; 240 MG/30ML; MG/30ML; MG/30ML
15 SUSPENSION ORAL EVERY 6 HOURS PRN
Status: DISCONTINUED | OUTPATIENT
Start: 2020-09-15 | End: 2020-09-16 | Stop reason: HOSPADM

## 2020-09-15 RX ORDER — BUSPIRONE HYDROCHLORIDE 10 MG/1
10 TABLET ORAL 3 TIMES DAILY
Status: CANCELLED | OUTPATIENT
Start: 2020-09-15

## 2020-09-15 RX ORDER — DULOXETIN HYDROCHLORIDE 60 MG/1
60 CAPSULE, DELAYED RELEASE ORAL DAILY
Status: ON HOLD | COMMUNITY
End: 2020-09-16 | Stop reason: SDUPTHER

## 2020-09-15 RX ORDER — TRAZODONE HYDROCHLORIDE 50 MG/1
100 TABLET ORAL NIGHTLY
Status: DISCONTINUED | OUTPATIENT
Start: 2020-09-15 | End: 2020-09-16 | Stop reason: HOSPADM

## 2020-09-15 RX ORDER — CEPHALEXIN 500 MG/1
500 CAPSULE ORAL EVERY 8 HOURS SCHEDULED
Status: DISCONTINUED | OUTPATIENT
Start: 2020-09-15 | End: 2020-09-16 | Stop reason: HOSPADM

## 2020-09-15 RX ORDER — ONDANSETRON 4 MG/1
4 TABLET, FILM COATED ORAL EVERY 6 HOURS PRN
Status: DISCONTINUED | OUTPATIENT
Start: 2020-09-15 | End: 2020-09-16 | Stop reason: HOSPADM

## 2020-09-15 RX ORDER — BENZONATATE 100 MG/1
100 CAPSULE ORAL 3 TIMES DAILY PRN
Status: DISCONTINUED | OUTPATIENT
Start: 2020-09-15 | End: 2020-09-16 | Stop reason: HOSPADM

## 2020-09-15 RX ORDER — TRAZODONE HYDROCHLORIDE 50 MG/1
12.5 TABLET ORAL NIGHTLY PRN
Status: DISCONTINUED | OUTPATIENT
Start: 2020-09-15 | End: 2020-09-15

## 2020-09-15 RX ORDER — LOPERAMIDE HYDROCHLORIDE 2 MG/1
2 CAPSULE ORAL
Status: DISCONTINUED | OUTPATIENT
Start: 2020-09-15 | End: 2020-09-16 | Stop reason: HOSPADM

## 2020-09-15 RX ORDER — BUSPIRONE HYDROCHLORIDE 10 MG/1
10 TABLET ORAL 3 TIMES DAILY
Status: DISCONTINUED | OUTPATIENT
Start: 2020-09-15 | End: 2020-09-16 | Stop reason: HOSPADM

## 2020-09-15 RX ORDER — IBUPROFEN 400 MG/1
400 TABLET ORAL EVERY 6 HOURS PRN
Status: DISCONTINUED | OUTPATIENT
Start: 2020-09-15 | End: 2020-09-16 | Stop reason: HOSPADM

## 2020-09-15 RX ADMIN — TRAZODONE HYDROCHLORIDE 100 MG: 50 TABLET ORAL at 20:55

## 2020-09-15 RX ADMIN — CEPHALEXIN 500 MG: 500 CAPSULE ORAL at 08:48

## 2020-09-15 RX ADMIN — BUSPIRONE HYDROCHLORIDE 10 MG: 10 TABLET ORAL at 20:55

## 2020-09-15 RX ADMIN — DULOXETINE HYDROCHLORIDE 60 MG: 30 CAPSULE, DELAYED RELEASE ORAL at 11:18

## 2020-09-15 RX ADMIN — CEPHALEXIN 500 MG: 500 CAPSULE ORAL at 13:46

## 2020-09-15 RX ADMIN — ALUMINUM HYDROXIDE, MAGNESIUM HYDROXIDE, AND DIMETHICONE 15 ML: 400; 400; 40 SUSPENSION ORAL at 13:50

## 2020-09-15 RX ADMIN — CEPHALEXIN 500 MG: 500 CAPSULE ORAL at 21:00

## 2020-09-15 RX ADMIN — BUSPIRONE HYDROCHLORIDE 10 MG: 10 TABLET ORAL at 15:23

## 2020-09-15 RX ADMIN — BUSPIRONE HYDROCHLORIDE 10 MG: 10 TABLET ORAL at 11:18

## 2020-09-15 RX ADMIN — ALUMINUM HYDROXIDE, MAGNESIUM HYDROXIDE, AND DIMETHICONE 15 ML: 400; 400; 40 SUSPENSION ORAL at 20:54

## 2020-09-15 NOTE — NURSING NOTE
Patient was a direct admit from [facility] for increasing depression and suicidal thoughts which he currently denies. Patient reports that his wife  after 28 years of marriage and he thinks it is because he got a Urostomy related to bladder cancer last year and that she need hormones but would never take them. Patient also reports he needs help finding a place to go when he leaves hear.

## 2020-09-15 NOTE — PLAN OF CARE
Goal Outcome Evaluation:  Plan of Care Reviewed With: patient     Outcome Summary: New patient, oriented to unit and unit rules, care plans initiated

## 2020-09-15 NOTE — PROGRESS NOTES
Historical Risk Factors:    1. Male over 64y/o:        1    2. Family Hx of suicide (parent, sib, or child):    0    3. Hx of past suicide attempts:      0    4. A serious, chronic pain or medical illness (or both):   0    5. Divorce, current marital separation of patient or   guardian, or other personal loss during past 12 months:   1    6. Ongoing substance abuse:       0              Subtotal: 2        Ongoing Risk Factors:    1. Insomnia- not sleeping at all?      Admission: 1 Discharge: 0_    2. Cannot Concentrate?       Admission: 1 Discharge: _0    3. Firearms in household?       Admission: 0 Discharge: 0_    4. Anhedonia (does not enjoy activity)?     Admission: 1 Discharge: 0    5. Severe anxiety with agitation, motor restlessness, and irritability? Admission: 1 Discharge: 0_    6. No hope for a better future?      Admission: 0 Discharge: 0_    7. Commanding auditory hallucinations that would be better off dead,   or to suicide and how?       Admission: 0 Discharge:0 _    8. Has made preparations for a potentially serious attempt (plan)?  Admission: 0 Discharge: 0_    9. Recent discharge from a Psychiatric facility?    Admission: 1 Discharge: 1_                Admission Subtotal: 5  Discharge Subtotal: 1        Total score of both historical and ongoing risk factors:     Admission Score Total: 7 Date: 09/15/20    Discharge Score Total: 3 Date:     At discharge factors that mitigates the risk of suicide:           Religous convictions: +   , Positive support system:      Not wanting to burden  the family::       Positive reasons for living: +    Admission Totaled By: Anatoliy GUILLERMO  Discharge Totaled By: Anatoliy GUILLERMO

## 2020-09-15 NOTE — DISCHARGE INSTR - APPOINTMENTS
[facility]  [address] [place] [id number]  [phone]    September 21 2020 at 10:00am with Carmen Lopez.  Fax discharge to [phone]

## 2020-09-15 NOTE — H&P
"INITIAL PSYCHIATRIC HISTORY & PHYSICAL    Patient Identification:  Name:  @  Age:   75 y.o.  Sex:   male  :   1945  MRN:   [id number]  Visit Number:   [id number]  Primary Care Physician:   Provider, No Known    SUBJECTIVE    CC/Focus of Exam: depression    HPI: James Boudreaux is a 75 y.o. male who was admitted on 9/15/2020 with complaints of thoughts of suicide.    Eighth or ninth psychiatric first [facility] admission for this 75-year-old  (4 months after 23 years of marriage, once previously  for 2 years) childless college-educated (masters degree in mental health, bachelor's degree in special education) regular attending [employer] retired  male who was referred from the [facility] having presented there voicing suicidal intent.    Patient states he has had repeated episodes of depression (no manic episodes) since his early 20s with 7 or 8 prior psychiatric hospitalizations.  Patient last hospitalized at the [facility] for a week or 10 days August of this year.  Patient states he has been depressed for the past 3 to 4 months.  This is directly associated with his being , court hearing yesterday.  \"She was my best friend, my purpose till things started to go badly this past spring, she was so angry I just could not stand it any longer, had to get away from her\".  Patient reports that the court hearing he had promised never to contact his wife.  His depressive symptomatology is includes loss of interest in usual interest, sense of hopelessness, anhedonism and insomnia.  Patient states he has had thoughts of suicide via carbon monoxide poisoning.  Recent medications included Cymbalta and BuSpar but patient has not taken this for the past week stating he lost his medications.  He has no permanent residency living in [facility] since his divorce \"looking for an apartment and a purpose\".  States he was going to [place] yesterday to apply for " part-time work at [employer] where he is worked previously prior to [employer] 10 years ago.  Patient admitted to the hospital at this time on a voluntary basis and participating in development treatment plan.    Available medical/psychiatric records reviewed and incorporated into the current document.     PAST PSYCHIATRIC HX: see HPI.     SUBSTANCE USE HX:   NONE        FAMILY HX: Father had depressive illness, a brother has been diagnosed with schizophrenia, no suicides among first-degree relatives.    SOCIAL HX: No legal problems, currently is homeless but has resources that include Social Security benefits.  Hopes to find part-time work and mental health.  No history of having been abused.  He is [place].  Does not really have any functioning support, states he has a friends in [facility].    Past medical history: History of bladder cancer, surgery last year, has a urostomy bag in place, no apparent recurrent or metastatic disease.  Has psoriasis and bilateral hearing loss.            Medications Prior to Admission   Medication Sig Dispense Refill Last Dose   • busPIRone (BUSPAR) 10 MG tablet Take 10 mg by mouth 3 (Three) Times a Day.   Past Week at Unknown time   • DULoxetine (CYMBALTA) 60 MG capsule Take 60 mg by mouth Daily.   Past Week at Unknown time   • traZODone (DESYREL) 150 MG tablet Take 150 mg by mouth Every Night.   Past Week at Unknown time           ALLERGIES:  Patient has no known allergies.    Temp:  [96.1 °F (35.6 °C)-96.7 °F (35.9 °C)] 96.7 °F (35.9 °C)  Heart Rate:  [100-109] 109  Resp:  [18-20] 20  BP: (118-133)/(82-92) 133/92    REVIEW OF SYSTEMS:  Review of Systems   Constitutional: Negative.    HENT: Negative.    Eyes: Negative.    Respiratory: Negative.    Cardiovascular: Negative.    Gastrointestinal: Negative.    Endocrine: Negative.    Genitourinary: Negative.    Musculoskeletal: Negative.    Skin: Positive for rash.   Allergic/Immunologic: Negative.    Neurological: Negative.     Hematological: Negative.       See HPI for psychiatric ROS  OBJECTIVE    PHYSICAL EXAM:  Physical Exam  Vitals signs and nursing note reviewed. Exam conducted with a chaperone present.   Constitutional:       Appearance: Normal appearance. He is normal weight.   HENT:      Head: Normocephalic and atraumatic.      Right Ear: External ear normal.      Left Ear: External ear normal.      Nose: Nose normal.      Mouth/Throat:      Pharynx: Oropharynx is clear.   Eyes:      Extraocular Movements: Extraocular movements intact.      Conjunctiva/sclera: Conjunctivae normal.      Pupils: Pupils are equal, round, and reactive to light.   Neck:      Musculoskeletal: Normal range of motion and neck supple.   Cardiovascular:      Rate and Rhythm: Regular rhythm. Tachycardia present.      Pulses: Normal pulses.      Heart sounds: Normal heart sounds.   Pulmonary:      Effort: Pulmonary effort is normal.      Breath sounds: Normal breath sounds.   Abdominal:      General: Abdomen is flat. Bowel sounds are normal.      Palpations: Abdomen is soft.   Genitourinary:     Comments: Urostomy bag in place  Musculoskeletal: Normal range of motion.   Skin:     General: Skin is warm and dry.      Comments: Mild psoriatic rash on his forehead   Neurological:      General: No focal deficit present.      Mental Status: He is alert and oriented to person, place, and time.         MENTAL STATUS EXAM:               Hygiene:   good  Cooperation:  Cooperative  Eye Contact:  Good  Psychomotor Behavior:  Appropriate  Affect:  Appropriate  Hopelessness: 6  Speech:  Normal  Thought Progress:  Linear  Thought Content:  Mood congruent  Suicidal:  {Patient verbalizing suicidal intent in the emergency room, denies active intent   homicidal:  None  Hallucinations:  None  Delusion:  None  Memory:  Intact  Orientation:  Person, Place, Time and Situation  Reliability:  good  Insight:  Good  Judgement:  Fair  Impulse Control:  Fair      Imaging Results (Last  24 Hours)     ** No results found for the last 24 hours. **           ECG/EMG Results (most recent)     None           No results found for: GLUCOSE, BUN, CREATININE, EGFRIFNONA, EGFRIFAFRI, BCR, CO2, CALCIUM, PROTENTOTREF, ALBUMIN, LABIL2, BILIRUBIN, AST, ALT    No results found for: WBC, HGB, HCT, MCV, PLT    Pain Management Panel     There is no flowsheet data to display.          Brief Urine Lab Results     None          Reviewed labs and studies done with this admission.       ASSESSMENT & PLAN:          Severe episode of recurrent major depressive disorder, without psychotic features (CMS/HCC)  Will resume the Cymbalta and BuSpar, individual and group supportive psychotherapy plus initiating disposition plan    History of bladder cancer  Immediate  intervention intervention indicated, chest x-ray   psoriasis  Topical steroid      The patient has been admitted for safety and stabilization.  Patient will be monitored for suicidality daily and maintained on Special Precautions Level 3 (q15 min checks) . The patient will have individual and group therapy with a master's level therapist. A master treatment plan will be developed and agreed upon by the patient and his/her treatment team.  The patient's estimated length of stay in the hospital is 5-7 days.       I spent a total of 70 minutes in direct patient care, greater than 40 minutes (greater than 50%) were spent face-to-face with assessment, coordination of care, counseling,  and answering any questions the patient had regarding his status and the treatment plan.     DIONISIO Cope MD    09/15/20  10:56 AM EDT

## 2020-09-15 NOTE — PLAN OF CARE
Problem: Adult Behavioral Health Plan of Care  Goal: Plan of Care Review  Outcome: Ongoing, Progressing  Flowsheets (Taken 9/15/2020 1412)  Consent Given to Review Plan with: Wife, Keiko Boudreaux  Progress: improving  Plan of Care Reviewed With: patient  Patient Agreement with Plan of Care: agrees  Outcome Summary:  • Therapist met with Patient to review care plan, social history, aftercare recommendation and disposition plan  • Patient agreeable.     Problem: Adult Behavioral Health Plan of Care  Goal: Patient-Specific Goal (Individualization)  Outcome: Ongoing, Progressing  Flowsheets  Taken 9/15/2020 1412  Patient-Specific Goals (Include Timeframe): Identify 2-3 healthy coping skills, complete aftercare plan, complete safety plan and deny SI/HI prior to discharge.  Individualized Care Needs: None voiced  Anxieties, Fears or Concerns: None voiced  Taken 9/15/2020 1405  Patient Personal Strengths:  • spiritual/[employer] support  • expressive of needs  • expressive of emotions  • resilient  • resourceful  • humor  • self-awareness  • self-reliant  • intellectual cognitive skills  Patient Vulnerabilities:  • limited support system  • family/relationship conflict  • housing insecurity     Problem: Adult Behavioral Health Plan of Care  Goal: Adheres to Safety Considerations for Self and Others  Outcome: Ongoing, Progressing     Problem: Adult Behavioral Health Plan of Care  Goal: Optimized Coping Skills in Response to Life Stressors  Outcome: Ongoing, Progressing  Intervention: Mutually Develop Transition Plan  Flowsheets (Taken 9/15/2020 1412)  Outpatient/Agency/Support Group Needs:  • outpatient psychiatric care (specify)  • outpatient counseling  Discharge Coordination/Progress:  • Therapist met with Patient to complete a discharge needs assessment  • Patient considering outpatient services. Patient has Medicare A&B. Patient will need transportation to [facility] to  his care.  Transition  Support:  • community resources reviewed  • crisis management plan promoted  • crisis management plan verbalized  • follow-up care discussed  Transportation Anticipated: (Patient's car is at [facility] and he will need a ride there to pick it up) public transportation  Anticipated Discharge Disposition: home or self-care  Transportation Concerns: car, none  Current Discharge Risk: psychiatric illness  Concerns to be Addressed:  • relationship  • homelessness  • coping/stress  • mental health  Readmission Within the Last 30 Days: no previous admission in last 30 days  Patient/Family Anticipated Services at Transition:  • mental health services  • outpatient care  Patient's Choice of Community Agency(s): [facility] in [place], [place]  Patient/Family Anticipates Transition to: (Hotel) other (see comments)  Intervention: Mutually Develop Transition Plan  Flowsheets (Taken 9/15/2020 1412)  Outpatient/Agency/Support Group Needs:  • outpatient psychiatric care (specify)  • outpatient counseling  Discharge Coordination/Progress:  • Therapist met with Patient to complete a discharge needs assessment  • Patient considering outpatient services. Patient has Medicare A&B. Patient will need transportation to [facility] to  his care.  Transition Support:  • community resources reviewed  • crisis management plan promoted  • crisis management plan verbalized  • follow-up care discussed  Transportation Anticipated: (Patient's car is at [facility] and he will need a ride there to pick it up) public transportation  Anticipated Discharge Disposition: home or self-care  Transportation Concerns: car, none  Current Discharge Risk: psychiatric illness  Concerns to be Addressed:  • relationship  • homelessness  • coping/stress  • mental health  Readmission Within the Last 30 Days: no previous admission in last 30 days  Patient/Family Anticipated Services at Transition:  •  mental health services  • outpatient care  Patient's Choice of Community Agency(s): [facility] in [place], [place]  Patient/Family Anticipates Transition to: (Hotel) other (see comments)     Problem: Adult Behavioral Health Plan of Care  Goal: Develops/Participates in Therapeutic [place] to Support Successful Transition  Intervention: Foster Therapeutic [place]  Flowsheets (Taken 9/15/2020 1412)  Trust Relationship/Rapport:  • care explained  • choices provided  • empathic listening provided  • questions answered  • questions encouraged  • emotional support provided  • reassurance provided  • thoughts/feelings acknowledged     Problem: Adult Behavioral Health Plan of Care  Goal: Develops/Participates in Therapeutic [place] to Support Successful Transition  Intervention: Mutually Develop Transition Plan  Flowsheets (Taken 9/15/2020 1412)  Outpatient/Agency/Support Group Needs:  • outpatient psychiatric care (specify)  • outpatient counseling  Discharge Coordination/Progress:  • Therapist met with Patient to complete a discharge needs assessment  • Patient considering outpatient services. Patient has Medicare A&B. Patient will need transportation to [facility] to  his care.  Transition Support:  • community resources reviewed  • crisis management plan promoted  • crisis management plan verbalized  • follow-up care discussed  Transportation Anticipated: (Patient's car is at [facility] and he will need a ride there to pick it up) public transportation  Anticipated Discharge Disposition: home or self-care  Transportation Concerns: car, none  Current Discharge Risk: psychiatric illness  Concerns to be Addressed:  • relationship  • homelessness  • coping/stress  • mental health  Readmission Within the Last 30 Days: no previous admission in last 30 days  Patient/Family Anticipated Services at Transition:  • mental health services  • outpatient care  Patient's Choice of  Community Agency(s): [facility] in [place], [place]  Patient/Family Anticipates Transition to: (Hotel) other (see comments)      1000:    DATA: Therapist met individually with Patient this date for initial evaluation.  Introduced self as Therapist and the role of a positive therapeutic relationship; Patient agreeable.      Therapist encouraged Patient to speak openly and honestly about any issues or stressors during treatment stay. Therapist explained how open communication is significant to providing most effective care.      Therapist completed psychosocial assessment, integrated summary, reviewed care plans, disposition planning and discussed hospitalization expectations and treatment goals this date.     Therapist provided education regarding different levels of care and is recommending outpatient care for most appropriate aftercare. Patient agreeable. Patient signed consent for [facility] in [place], [place].     Therapist is recommending family involvement prior to discharge and it's importance. Patient agreeable and signed consent for Keiko Boudreaux. Patient states he does not think his wife will want to be involved in his care, but that we can attempt to contact her. Therapist attempted contact with her and was unsuccessful.     CLINICAL MANUVERING/INTERVENTIONS:  Assisted Patient in processing session content; acknowledged and normalized Patient’s thoughts, feelings, and concerns by utilizing a person-centered approach in efforts to build appropriate rapport and a positive therapeutic relationship with open and honest communication. Allowed Patient to ventilate regarding current stressors and triggers for negative emotions and thoughts in a safe nonjudgmental environment with unconditional positive regard, active listening skills, and empathy.     ASSESSMENT: James Boudreaux is a 75 year old male educated, homeless male who presented as a direct admit from [facility]. Patient states he presented to [facility] with anxiety and depression and they sent him to this facility due to not having any available beds. Patient was calm and cooperative during assessment. Patient is very pleasant to speak with. Patient states he has had a lot of anxiety and depression as his \"marriage fell apart\" in June of this year. Patient states he and his spouse have been  for 23 years until she kicked him out. Patient reports he has been staying at a hotel since she kicked him out in June. Patient states his wife had some hormone issues and refused to take any hormone replacements and it made her very mean leading up to the separation. Patient states he has a Masters degree in Mental Health counseling. Patient reports he would love to be able to get back into the field and work again. Patient reports he has a brother and a sister living but that they do not have a good relationship and he no longer speaks with them. Patient denies any substance abuse and states he used to drink \"one drink a day\" because the doctor said to, but has not had anything to drink in about 10 weeks. Patient denies SI/HI and states he has just been depressed lately over the separation with his wife.     PLAN: Patient will receive 24/7 nursing monitoring and daily psychiatrist evaluation by a multidisciplinary team.    Patient will continue stabilization at this time.     Patient is agreeable for outpatient services with [facility] in [place], [place]; consent signed.     Public assistance with transportation will be needed. Patient's car is at [facility] and he states he will need a ride there to get it at discharge. Patient reports he will be staying in a hotel in [place] until he gets himself an apartment. Patient reports he has money.   "

## 2020-09-16 VITALS
HEART RATE: 88 BPM | BODY MASS INDEX: 26.9 KG/M2 | OXYGEN SATURATION: 97 % | RESPIRATION RATE: 17 BRPM | SYSTOLIC BLOOD PRESSURE: 142 MMHG | WEIGHT: 167.4 LBS | HEIGHT: 66 IN | TEMPERATURE: 97.8 F | DIASTOLIC BLOOD PRESSURE: 70 MMHG

## 2020-09-16 PROCEDURE — 99239 HOSP IP/OBS DSCHRG MGMT >30: CPT | Performed by: PSYCHIATRY & NEUROLOGY

## 2020-09-16 RX ORDER — BUSPIRONE HYDROCHLORIDE 10 MG/1
10 TABLET ORAL 3 TIMES DAILY
Qty: 90 TABLET | Refills: 0 | Status: SHIPPED | OUTPATIENT
Start: 2020-09-16

## 2020-09-16 RX ORDER — DULOXETIN HYDROCHLORIDE 60 MG/1
60 CAPSULE, DELAYED RELEASE ORAL DAILY
Qty: 30 CAPSULE | Refills: 0 | Status: SHIPPED | OUTPATIENT
Start: 2020-09-16

## 2020-09-16 RX ADMIN — CEPHALEXIN 500 MG: 500 CAPSULE ORAL at 06:15

## 2020-09-16 RX ADMIN — DULOXETINE HYDROCHLORIDE 60 MG: 30 CAPSULE, DELAYED RELEASE ORAL at 08:01

## 2020-09-16 RX ADMIN — BUSPIRONE HYDROCHLORIDE 10 MG: 10 TABLET ORAL at 08:01

## 2020-09-16 NOTE — PLAN OF CARE
Goal Outcome Evaluation:  Plan of Care Reviewed With: patient  Progress: improving  Outcome Summary: Patient is being discharged at this time.

## 2020-09-16 NOTE — PROGRESS NOTES
"1024:    Patient is being discharged home on this date. Therapist spoke with Patient who states he is feeling \"great\" today. Patient states the medication they have him on is really working. Patient denies any SI/HI or anxiety and depression today. Patient is very appreciative of the care he received here and states he is just anxious for discharge.     Patient needs transportation assistance to [facility] to get his car.     Patient has an aftercare appointment scheduled with [facility] for September 21 2020 at 10:00am with Carmen Lopez.  "

## 2020-09-16 NOTE — PLAN OF CARE
"Goal Outcome Evaluation:  Plan of Care Reviewed With: patient  Progress: improving  Outcome Summary: Patient calm and cooperative. Reports that depression is \"much better\" than when he was admitted. Patient slept well throughout the night.  "

## 2020-09-16 NOTE — DISCHARGE SUMMARY
"  Date of Discharge:  9/16/2020    Discharge Diagnosis:Active Problems:    Severe episode of recurrent major depressive disorder, without psychotic features (CMS/HCC)    History of bladder cancer    Psoriasis        Presenting Problem/History of Present Illness:Patient was admitted to the hospital on September 15 having presented depressed verbalizing suicidal intent, referred from the [facility] emergency room, voluntarily admitted for safety evaluation and treatment, see admission note for details.         Hospital Course:  Patient was admitted for safety and stabilization and was placed on standard precautions.  Routine labs were checked.  Patient was assigned a masters level therapist and provided with an opportunity to participate in group and individual therapy on the unit.  Patient seen on a daily basis for evaluation and supportive therapy.  Patient restarted on his Cymbalta 60 mg a day and BuSpar 10 mg 3 times daily with a rather rapid resolution of his depressive symptomatology.  Patient slept well throughout his hospital stay.  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  Patient denying any thoughts of harming himself or others stating that he is accepted the divorce in simply \" moving on\".  Patient to arrange for his own follow-up appointments in the [facility].  See below for medications.    Consults:   Consults     No orders found for last 30 day(s).          Labs:  Lab Results (all)     Procedure Component Value Units Date/Time    TSH [[id number]]  (Normal) Collected: 09/15/20 1211    Specimen: Blood Updated: 09/15/20 1306     TSH 0.917 uIU/mL     T4, Free [[id number]]  (Normal) Collected: 09/15/20 1211    Specimen: Blood Updated: 09/15/20 1306     Free T4 1.44 ng/dL     Narrative:      Results may be falsely increased if patient taking Biotin.            Imaging:  Imaging Results (All)     Procedure " Component Value Units Date/Time    XR Chest PA & Lateral [[id number]] Collected: 09/15/20 1529     Updated: 09/15/20 1531    Narrative:      EXAMINATION: XR CHEST PA AND LATERAL-      CLINICAL INDICATION: History of bladder cancer        COMPARISON: None available      TECHNIQUE: XR CHEST PA AND LATERAL-      FINDINGS:   Lungs are adequately aerated.   Heart and mediastinal contours are unremarkable.   No pneumothorax.   Bony and soft tissue structures are unremarkable.          Impression:      No radiographic evidence of acute cardiac or pulmonary disease.     This report was finalized on 9/15/2020 3:29 PM by Dr. Danny Foreman MD.             Condition on Discharge:  improved    Prognosis: Fair    Vital Signs  Temp:  [97.5 °F (36.4 °C)-98.5 °F (36.9 °C)] 97.5 °F (36.4 °C)  Heart Rate:  [] 115  Resp:  [18-20] 18  BP: (114-130)/(68-96) 130/96    Discharge Disposition  Home or Self Care    Discharge Medications     Discharge Medications      Continue These Medications      Instructions Start Date   busPIRone 10 MG tablet  Commonly known as: BUSPAR   10 mg, Oral, 3 Times Daily      DULoxetine 60 MG capsule  Commonly known as: CYMBALTA   60 mg, Oral, Daily      traZODone 150 MG tablet  Commonly known as: DESYREL   150 mg, Oral, Nightly             Discharge Diet: Regular    Activity at Discharge: No restrictions    Follow-up Appointments: Patient to arrange for follow-up in the [facility], [facility].          Alfredo Cope MD  09/16/20  09:58 EDT  Time spent with the discharge process >30 minutes.     Dictated utilizing Dragon dictation